# Patient Record
Sex: MALE | Race: WHITE | Employment: FULL TIME | URBAN - METROPOLITAN AREA
[De-identification: names, ages, dates, MRNs, and addresses within clinical notes are randomized per-mention and may not be internally consistent; named-entity substitution may affect disease eponyms.]

---

## 2017-03-02 ENCOUNTER — TELEPHONE (OUTPATIENT)
Dept: GASTROENTEROLOGY | Facility: CLINIC | Age: 62
End: 2017-03-02

## 2017-03-02 NOTE — TELEPHONE ENCOUNTER
Patient was unable to be contacted, also unable to leave a message due to full mailbox.  Jose Rogel, CMA

## 2017-03-13 ENCOUNTER — OFFICE VISIT (OUTPATIENT)
Dept: GASTROENTEROLOGY | Facility: CLINIC | Age: 62
End: 2017-03-13
Attending: INTERNAL MEDICINE
Payer: COMMERCIAL

## 2017-03-13 VITALS
HEIGHT: 70 IN | DIASTOLIC BLOOD PRESSURE: 86 MMHG | WEIGHT: 190.8 LBS | HEART RATE: 120 BPM | SYSTOLIC BLOOD PRESSURE: 121 MMHG | TEMPERATURE: 98.1 F | BODY MASS INDEX: 27.32 KG/M2

## 2017-03-13 DIAGNOSIS — K74.60 CIRRHOSIS OF LIVER WITHOUT ASCITES, UNSPECIFIED HEPATIC CIRRHOSIS TYPE (H): ICD-10-CM

## 2017-03-13 DIAGNOSIS — K74.60 CIRRHOSIS OF LIVER WITHOUT ASCITES, UNSPECIFIED HEPATIC CIRRHOSIS TYPE (H): Primary | ICD-10-CM

## 2017-03-13 LAB
AFP SERPL-MCNC: 8.5 UG/L (ref 0–8)
ALBUMIN SERPL-MCNC: 4 G/DL (ref 3.4–5)
ALP SERPL-CCNC: 91 U/L (ref 40–150)
ALT SERPL W P-5'-P-CCNC: 66 U/L (ref 0–70)
ANION GAP SERPL CALCULATED.3IONS-SCNC: 9 MMOL/L (ref 3–14)
AST SERPL W P-5'-P-CCNC: 41 U/L (ref 0–45)
BILIRUB DIRECT SERPL-MCNC: 0.4 MG/DL (ref 0–0.2)
BILIRUB SERPL-MCNC: 1.1 MG/DL (ref 0.2–1.3)
BUN SERPL-MCNC: 14 MG/DL (ref 7–30)
CALCIUM SERPL-MCNC: 8.9 MG/DL (ref 8.5–10.1)
CHLORIDE SERPL-SCNC: 104 MMOL/L (ref 94–109)
CO2 SERPL-SCNC: 29 MMOL/L (ref 20–32)
CREAT SERPL-MCNC: 0.99 MG/DL (ref 0.66–1.25)
ERYTHROCYTE [DISTWIDTH] IN BLOOD BY AUTOMATED COUNT: 14 % (ref 10–15)
GFR SERPL CREATININE-BSD FRML MDRD: 76 ML/MIN/1.7M2
GLUCOSE SERPL-MCNC: 121 MG/DL (ref 70–99)
HCT VFR BLD AUTO: 44.7 % (ref 40–53)
HGB BLD-MCNC: 15.3 G/DL (ref 13.3–17.7)
INR PPP: 1.54 (ref 0.86–1.14)
MCH RBC QN AUTO: 32.4 PG (ref 26.5–33)
MCHC RBC AUTO-ENTMCNC: 34.2 G/DL (ref 31.5–36.5)
MCV RBC AUTO: 95 FL (ref 78–100)
PLATELET # BLD AUTO: 115 10E9/L (ref 150–450)
POTASSIUM SERPL-SCNC: 3.8 MMOL/L (ref 3.4–5.3)
PROT SERPL-MCNC: 8 G/DL (ref 6.8–8.8)
RBC # BLD AUTO: 4.72 10E12/L (ref 4.4–5.9)
SODIUM SERPL-SCNC: 141 MMOL/L (ref 133–144)
WBC # BLD AUTO: 4.7 10E9/L (ref 4–11)

## 2017-03-13 PROCEDURE — 80048 BASIC METABOLIC PNL TOTAL CA: CPT | Performed by: INTERNAL MEDICINE

## 2017-03-13 PROCEDURE — 82105 ALPHA-FETOPROTEIN SERUM: CPT | Performed by: INTERNAL MEDICINE

## 2017-03-13 PROCEDURE — 80076 HEPATIC FUNCTION PANEL: CPT | Performed by: INTERNAL MEDICINE

## 2017-03-13 PROCEDURE — 85027 COMPLETE CBC AUTOMATED: CPT | Performed by: INTERNAL MEDICINE

## 2017-03-13 PROCEDURE — 36415 COLL VENOUS BLD VENIPUNCTURE: CPT | Performed by: INTERNAL MEDICINE

## 2017-03-13 PROCEDURE — 85610 PROTHROMBIN TIME: CPT | Performed by: INTERNAL MEDICINE

## 2017-03-13 PROCEDURE — 99212 OFFICE O/P EST SF 10 MIN: CPT | Mod: ZF

## 2017-03-13 ASSESSMENT — PAIN SCALES - GENERAL: PAINLEVEL: MODERATE PAIN (4)

## 2017-03-13 NOTE — PROGRESS NOTES
I had the pleasure of seeing Dwight Sanchez for followup in the Liver Clinic at the [facility] on 03/13/2017.  Mr. 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  He is a sustained virologic responder to hepatitis C treatment.      His major clinical complaint is that of recurrent atrial fibrillation.  He has had ablations done x3. He has been cardioverted multiple times, but it lasts for a very short period of time.  He has been tried a number of medications, but nothing has been able to keep him in sinus rhythm.  He is on oral anticoagulation.      From a liver standpoint, he does report some very mild epigastric pain.  He denies any itching or skin rash.  He does complain of fairly marked fatigue which he blames on his atrial fibrillation.  He denies any fevers or chills.  He does have fairly significant dyspnea on exertion.  He denies any nausea, vomiting, diarrhea or constipation.  He has not had any gastrointestinal bleeding or any overt signs of encephalopathy.  His appetite has been good, and his weight has remained for the most part the same.  He has not had any gastrointestinal bleeding or overt signs of encephalopathy.  There have been no other new events since he was last seen.       Current Outpatient Prescriptions   Medication     lisinopril (PRINIVIL,ZESTRIL) 20 MG tablet     atenolol (TENORMIN) 25 MG tablet     rivaroxaban ANTICOAGULANT (XARELTO) 20 MG TABS tablet     furosemide (LASIX) 20 MG tablet     vitamin E 400 UNIT capsule     No current facility-administered medications for this visit.      B/P: 121/86, T: 98.1, P: 120, R: Data Unavailable    HEENT exam shows no scleral icterus and no temporal muscle wasting.  His chest is clear.  His abdominal exam shows no increase in girth.  No masses or tenderness to palpation are present.  His liver is at 10 cm in span without left lobe enlargement.  No spleen tip is palpable, and extremity exam shows  no edema.  Skin exam shows no stigmata of chronic liver disease.  Neurologic exam shows no asterixis.       Recent Results (from the past 168 hour(s))    Hepatic Panel [LAB20]    Collection Time: 03/13/17  1:44 PM   Result Value Ref Range    Bilirubin Direct 0.4 (H) 0.0 - 0.2 mg/dL    Bilirubin Total 1.1 0.2 - 1.3 mg/dL    Albumin 4.0 3.4 - 5.0 g/dL    Protein Total 8.0 6.8 - 8.8 g/dL    Alkaline Phosphatase 91 40 - 150 U/L    ALT 66 0 - 70 U/L    AST 41 0 - 45 U/L   Basic metabolic panel [LAB15]    Collection Time: 03/13/17  1:44 PM   Result Value Ref Range    Sodium 141 133 - 144 mmol/L    Potassium 3.8 3.4 - 5.3 mmol/L    Chloride 104 94 - 109 mmol/L    Carbon Dioxide 29 20 - 32 mmol/L    Anion Gap 9 3 - 14 mmol/L    Glucose 121 (H) 70 - 99 mg/dL    Urea Nitrogen 14 7 - 30 mg/dL    Creatinine 0.99 0.66 - 1.25 mg/dL    GFR Estimate 76 >60 mL/min/1.7m2    GFR Estimate If Black >90   GFR Calc   >60 mL/min/1.7m2    Calcium 8.9 8.5 - 10.1 mg/dL   CBC with platelets [JFI418]    Collection Time: 03/13/17  1:44 PM   Result Value Ref Range    WBC 4.7 4.0 - 11.0 10e9/L    RBC Count 4.72 4.4 - 5.9 10e12/L    Hemoglobin 15.3 13.3 - 17.7 g/dL    Hematocrit 44.7 40.0 - 53.0 %    MCV 95 78 - 100 fl    MCH 32.4 26.5 - 33.0 pg    MCHC 34.2 31.5 - 36.5 g/dL    RDW 14.0 10.0 - 15.0 %    Platelet Count 115 (L) 150 - 450 10e9/L   INR [[id number]]    Collection Time: 03/13/17  1:44 PM   Result Value Ref Range    INR 1.54 (H) 0.86 - 1.14      My impression is that Mr. 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  His liver disease is very well compensated.  All complications are well addressed.  He will get another MRI in May, as he has had lesions seen by ultrasound that have not been confirmed by MRI.  He will be seeing the electrophysiologist next week, and I certainly recommended that he ask about all potential approaches to his atrial fibrillation, as he seems to be quite symptomatic and really it his major  source of disability.  I, otherwise, will not be making any change to his medical regimen.  I will see him back in the clinic again in 6 months.      Thank you very much for allowing me to participate in the care of this patient.  If you have any questions regarding my recommendations, please do not hesitate to contact me.       Dwight Zamudio MD      Professor of Medicine  [employer]      Executive Medical Director, Solid Organ Transplant Program  [facility]

## 2017-03-13 NOTE — MR AVS SNAPSHOT
After Visit Summary   3/13/2017    Dwight Sanchez    MRN: [id number]           Patient Information     Date Of Birth          1955        Visit Information        Provider Department      3/13/2017 2:15 PM Dwight Zamudio MD [facility]        Today's Diagnoses     Cirrhosis of liver without ascites, unspecified hepatic cirrhosis type (H)    -  1       Follow-ups after your visit        Follow-up notes from your care team     Return in about 6 months (around 9/13/2017).      Your next 10 appointments already scheduled     Apr 05, 2017  8:00 AM CDT   (Arrive by 7:45 AM)   RETURN ARRHYTHMIA with Meek Edmond MD   [facility] ([facility])    [address] [place] [address]  3rd [facility] [address] [phone]            Apr 05, 2017  1:00 PM CDT   (Arrive by 12:45 PM)   MR ABDOMEN W/O & W CONTRAST with [address] MRI ([facility])    [address] [place] [id number] [phone]           Take your medicines as usual, unless your doctor tells you not to. Bring a list of your current medicines to your exam (including vitamins, minerals and over-the-counter drugs). Also bring the results of similar scans you may have had.    The day before your exam, drink extra fluids at least six 8-ounce glasses (unless your doctor tells you to restrict your fluids).   Have a blood test (creatinine test) within 30 days of your exam. Go to your clinic or Diagnostic Imaging Department for this test.   Do not eat or drink for 6 hours prior to exam.  The MRI machine uses a strong magnet. Please wear clothes without metal (snaps, zippers). A sweatsuit works well, or we may give you a hospital gown.  Please remove any body piercings and hair extensions before you arrive. You will also remove watches, jewelry, hairpins, wallets, dentures, partial dental plates and hearing aids. You may wear contact lenses,  and you may be able to wear your rings. We have a safe place to keep your personal items, but it is safer to leave them at home.   **IMPORTANT** THE INSTRUCTIONS BELOW ARE ONLY FOR THOSE PATIENTS WHO HAVE BEEN TOLD THEY WILL RECEIVE SEDATION OR GENERAL ANESTHESIA DURING THEIR MRI PROCEDURE:  IF YOU WILL RECEIVE SEDATION (take medicine to help you relax during your exam):   You must get the medicine from your doctor before you arrive. Bring the medicine to the exam. Do not take it at home.   Arrive one hour early. Bring someone who can take you home after the test. Your medicine will make you sleepy. After the exam, you may not drive, take a bus or take a taxi by yourself.   No eating 8 hours before your exam. You may have clear liquids up until 4 hours before your exam. (Clear liquids include water, clear tea, black coffee and fruit juice without pulp.)  IF YOU WILL RECEIVE ANESTHESIA (be asleep for your exam):   Arrive 1 1/2 hours early. Bring someone who can take you home after the test. You may not drive, take a bus or take a taxi by yourself.   No eating 8 hours before your exam. You may have clear liquids up until 4 hours before your exam. (Clear liquids include water, clear tea, black coffee and fruit juice without pulp.)  If you have any questions, please contact your Imaging Department exam site.            Sep 11, 2017  1:00 PM CDT   Lab with  LAB   [facility] ([facility])    [address] [place] [address]  1st [facility] [id number] [phone]            Sep 11, 2017  2:00 PM CDT   (Arrive by 1:45 PM)   Return General Liver with Dwight Zamudio MD   [facility] ([facility])    [address] [place] [address]  3[address] [facility] [id number] [phone]              Future tests that were ordered for you today     Open Future Orders        Priority Expected Expires Ordered    MRI Abdomen w & w/o contrast* Routine 5/13/2017 6/12/2017 3/13/2017      "Hepatic Panel [LAB20] Routine 9/9/2017 4/12/2018 3/13/2017    Basic metabolic panel [LAB15] Routine 9/9/2017 4/12/2018 3/13/2017    CBC with platelets [[id number]] Routine 9/9/2017 4/12/2018 3/13/2017    INR [[id number]] Routine 9/9/2017 4/12/2018 3/13/2017    AFP tumor marker [BAJ801] Routine 9/9/2017 4/12/2018 3/13/2017            Who to contact     If you have questions or need follow up information about today's clinic visit or your schedule please contact [facility]PAT[facility] directly at [phone].  Normal or non-critical lab and imaging results will be communicated to you by One [employer] Translation[employer], letter or phone within 4 business days after the clinic has received the results. If you do not hear from us within 7 days, please contact the clinic through Gamart or phone. If you have a critical or abnormal lab result, we will notify you by phone as soon as possible.  Submit refill requests through [employer] or call your pharmacy and they will forward the refill request to us. Please allow 3 business days for your refill to be completed.          Additional Information About Your Visit        [employer] Information     [employer] gives you secure access to your electronic health record. If you see a primary care provider, you can also send messages to your care team and make appointments. If you have questions, please call your primary care clinic.  If you do not have a primary care provider, please call [phone] and they will assist you.        Care EveryWhere ID     This is your Care EveryWhere ID. This could be used by other organizations to access your [facility] medical records  [id number]        Your Vitals Were     Pulse Temperature Height BMI (Body Mass Index)          120 98.1  F (36.7  C) (Oral) 1.778 m (5' 10\") 27.38 kg/m2         Blood Pressure from Last 3 Encounters:   03/13/17 121/86   09/15/16 134/90   07/29/16 156/75    Weight from Last 3 Encounters:   03/13/17 86.5 kg (190 lb 12.8 oz)   09/15/16 86.2 kg (190 lb) "   07/29/16 84.8 kg (187 lb)              We Performed the Following     Schedule follow up appointments          Today's Medication Changes          These changes are accurate as of: 3/13/17  2:35 PM.  If you have any questions, ask your nurse or doctor.               These medicines have changed or have updated prescriptions.        Dose/Directions    atenolol 25 MG tablet   Commonly known as:  TENORMIN   This may have changed:    - how much to take  - when to take this   Used for:  Atrial fibrillation, unspecified type (H)        Dose:  25 mg   Take 1 tablet (25 mg) by mouth daily   Quantity:  90 tablet   Refills:  1                Primary Care Provider Office Phone # Fax #    Hector Villar [phone]       [facility] [address] [facility] [address]        Thank you!     Thank you for choosing [facility]PAT[facility]  for your care. Our goal is always to provide you with excellent care. Hearing back from our patients is one way we can continue to improve our services. Please take a few minutes to complete the written survey that you may receive in the mail after your visit with us. Thank you!             Your Updated Medication List - Protect others around you: Learn how to safely use, store and throw away your medicines at www.disposemymeds.org.          This list is accurate as of: 3/13/17  2:35 PM.  Always use your most recent med list.                   Brand Name Dispense Instructions for use    atenolol 25 MG tablet    TENORMIN    90 tablet    Take 1 tablet (25 mg) by mouth daily       furosemide 20 MG tablet    LASIX    62 tablet    Take 1 tablet (20 mg) by mouth 2 times daily       lisinopril 20 MG tablet    PRINIVIL/ZESTRIL    90 tablet    Take 1 tablet (20 mg) by mouth daily       rivaroxaban ANTICOAGULANT 20 MG Tabs tablet    XARELTO    90 tablet    Take 1 tablet (20 mg) by mouth daily (with dinner)       vitamin E 400 UNIT capsule     100 capsule    Take 1 capsule by mouth daily.

## 2017-03-13 NOTE — NURSING NOTE
Chief Complaint   Patient presents with     RECHECK     Cirrhosis of Liver   Pt roomed, vitals, meds, and allergies reviewed with pt. Pt ready for provider.  Jose Rogel, CMA

## 2017-03-13 NOTE — LETTER
3/13/2017      RE: Dwight Sanchez  [address] [facility] [address]       I had the pleasure of seeing Dwight Sanchez for followup in the Liver Clinic at the [facility] on 03/13/2017.  Mr. 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  He is a sustained virologic responder to hepatitis C treatment.      His major clinical complaint is that of recurrent atrial fibrillation.  He has had ablations done x3. He has been cardioverted multiple times, but it lasts for a very short period of time.  He has been tried a number of medications, but nothing has been able to keep him in sinus rhythm.  He is on oral anticoagulation.      From a liver standpoint, he does report some very mild epigastric pain.  He denies any itching or skin rash.  He does complain of fairly marked fatigue which he blames on his atrial fibrillation.  He denies any fevers or chills.  He does have fairly significant dyspnea on exertion.  He denies any nausea, vomiting, diarrhea or constipation.  He has not had any gastrointestinal bleeding or any overt signs of encephalopathy.  His appetite has been good, and his weight has remained for the most part the same.  He has not had any gastrointestinal bleeding or overt signs of encephalopathy.  There have been no other new events since he was last seen.       Current Outpatient Prescriptions   Medication     lisinopril (PRINIVIL,ZESTRIL) 20 MG tablet     atenolol (TENORMIN) 25 MG tablet     rivaroxaban ANTICOAGULANT (XARELTO) 20 MG TABS tablet     furosemide (LASIX) 20 MG tablet     vitamin E 400 UNIT capsule     No current facility-administered medications for this visit.      B/P: 121/86, T: 98.1, P: 120, R: Data Unavailable    HEENT exam shows no scleral icterus and no temporal muscle wasting.  His chest is clear.  His abdominal exam shows no increase in girth.  No masses or tenderness to palpation are present.  His liver is at 10 cm  in span without left lobe enlargement.  No spleen tip is palpable, and extremity exam shows no edema.  Skin exam shows no stigmata of chronic liver disease.  Neurologic exam shows no asterixis.       Recent Results (from the past 168 hour(s))    Hepatic Panel [LAB20]    Collection Time: 03/13/17  1:44 PM   Result Value Ref Range    Bilirubin Direct 0.4 (H) 0.0 - 0.2 mg/dL    Bilirubin Total 1.1 0.2 - 1.3 mg/dL    Albumin 4.0 3.4 - 5.0 g/dL    Protein Total 8.0 6.8 - 8.8 g/dL    Alkaline Phosphatase 91 40 - 150 U/L    ALT 66 0 - 70 U/L    AST 41 0 - 45 U/L   Basic metabolic panel [LAB15]    Collection Time: 03/13/17  1:44 PM   Result Value Ref Range    Sodium 141 133 - 144 mmol/L    Potassium 3.8 3.4 - 5.3 mmol/L    Chloride 104 94 - 109 mmol/L    Carbon Dioxide 29 20 - 32 mmol/L    Anion Gap 9 3 - 14 mmol/L    Glucose 121 (H) 70 - 99 mg/dL    Urea Nitrogen 14 7 - 30 mg/dL    Creatinine 0.99 0.66 - 1.25 mg/dL    GFR Estimate 76 >60 mL/min/1.7m2    GFR Estimate If Black >90   GFR Calc   >60 mL/min/1.7m2    Calcium 8.9 8.5 - 10.1 mg/dL   CBC with platelets [[id number]]    Collection Time: 03/13/17  1:44 PM   Result Value Ref Range    WBC 4.7 4.0 - 11.0 10e9/L    RBC Count 4.72 4.4 - 5.9 10e12/L    Hemoglobin 15.3 13.3 - 17.7 g/dL    Hematocrit 44.7 40.0 - 53.0 %    MCV 95 78 - 100 fl    MCH 32.4 26.5 - 33.0 pg    MCHC 34.2 31.5 - 36.5 g/dL    RDW 14.0 10.0 - 15.0 %    Platelet Count 115 (L) 150 - 450 10e9/L   INR [[id number]]    Collection Time: 03/13/17  1:44 PM   Result Value Ref Range    INR 1.54 (H) 0.86 - 1.14      My impression is that Mr. 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  His liver disease is very well compensated.  All complications are well addressed.  He will get another MRI in May, as he has had lesions seen by ultrasound that have not been confirmed by MRI.  He will be seeing the electrophysiologist next week, and I certainly recommended that he ask about all potential  approaches to his atrial fibrillation, as he seems to be quite symptomatic and really it his major source of disability.  I, otherwise, will not be making any change to his medical regimen.  I will see him back in the clinic again in 6 months.      Thank you very much for allowing me to participate in the care of this patient.  If you have any questions regarding my recommendations, please do not hesitate to contact me.       Dwight Zamudio MD      Professor of Medicine  [employer]      Executive Medical Director, Solid Organ Transplant Program  [facility]

## 2017-04-04 ENCOUNTER — PRE VISIT (OUTPATIENT)
Dept: CARDIOLOGY | Facility: CLINIC | Age: 62
End: 2017-04-04

## 2017-04-04 DIAGNOSIS — I48.91 ATRIAL FIBRILLATION, UNSPECIFIED TYPE (H): Primary | ICD-10-CM

## 2017-04-04 ASSESSMENT — ENCOUNTER SYMPTOMS
WHEEZING: 1
DYSURIA: 0
SNORES LOUDLY: 1
MUSCLE WEAKNESS: 0
DIFFICULTY URINATING: 1
SYNCOPE: 0
DYSPNEA ON EXERTION: 0
LIGHT-HEADEDNESS: 0
EYE PAIN: 0
ALTERED TEMPERATURE REGULATION: 0
SKIN CHANGES: 0
SLEEP DISTURBANCES DUE TO BREATHING: 1
STIFFNESS: 1
RESPIRATORY PAIN: 0
TACHYCARDIA: 1
NECK MASS: 0
ORTHOPNEA: 1
SPUTUM PRODUCTION: 1
MUSCLE CRAMPS: 1
TASTE DISTURBANCE: 0
EYE IRRITATION: 0
HYPOTENSION: 0
POOR WOUND HEALING: 0
EXERCISE INTOLERANCE: 1
NECK PAIN: 1
INCREASED ENERGY: 1
HOARSE VOICE: 1
JOINT SWELLING: 0
HYPERTENSION: 1
POLYPHAGIA: 0
COUGH DISTURBING SLEEP: 1
WEIGHT LOSS: 0
NIGHT SWEATS: 0
POSTURAL DYSPNEA: 0
CHILLS: 0
MYALGIAS: 1
HALLUCINATIONS: 0
ARTHRALGIAS: 1
SINUS PAIN: 0
SMELL DISTURBANCE: 0
NAIL CHANGES: 0
DOUBLE VISION: 0
WEIGHT GAIN: 1
FLANK PAIN: 0
LEG SWELLING: 0
SHORTNESS OF BREATH: 1
EYE REDNESS: 1
PALPITATIONS: 1
CLAUDICATION: 0
LEG PAIN: 0
SINUS CONGESTION: 1
COUGH: 1
DECREASED APPETITE: 0
POLYDIPSIA: 0
HEMOPTYSIS: 0
FEVER: 0
FATIGUE: 1
SORE THROAT: 1
BACK PAIN: 1
EYE WATERING: 0
HEMATURIA: 0
TROUBLE SWALLOWING: 0

## 2017-04-05 ENCOUNTER — OFFICE VISIT (OUTPATIENT)
Dept: CARDIOLOGY | Facility: CLINIC | Age: 62
End: 2017-04-05
Attending: INTERNAL MEDICINE
Payer: COMMERCIAL

## 2017-04-05 VITALS
BODY MASS INDEX: 27.9 KG/M2 | WEIGHT: 194.9 LBS | HEIGHT: 70 IN | OXYGEN SATURATION: 97 % | SYSTOLIC BLOOD PRESSURE: 138 MMHG | HEART RATE: 114 BPM | DIASTOLIC BLOOD PRESSURE: 88 MMHG

## 2017-04-05 DIAGNOSIS — F41.9 ANXIETY: ICD-10-CM

## 2017-04-05 DIAGNOSIS — I48.91 ATRIAL FIBRILLATION, UNSPECIFIED TYPE (H): ICD-10-CM

## 2017-04-05 PROCEDURE — 99212 OFFICE O/P EST SF 10 MIN: CPT | Mod: ZF

## 2017-04-05 PROCEDURE — 93005 ELECTROCARDIOGRAM TRACING: CPT | Mod: ZF

## 2017-04-05 PROCEDURE — 99214 OFFICE O/P EST MOD 30 MIN: CPT | Mod: ZP | Performed by: INTERNAL MEDICINE

## 2017-04-05 PROCEDURE — 93010 ELECTROCARDIOGRAM REPORT: CPT | Mod: ZP | Performed by: INTERNAL MEDICINE

## 2017-04-05 RX ORDER — METOPROLOL SUCCINATE 100 MG/1
100 TABLET, EXTENDED RELEASE ORAL DAILY
Qty: 90 TABLET | Refills: 3 | Status: SHIPPED | OUTPATIENT
Start: 2017-04-05 | End: 2017-04-10

## 2017-04-05 RX ORDER — DIAZEPAM 5 MG
10 TABLET ORAL EVERY 6 HOURS PRN
Qty: 4 TABLET | Refills: 0 | Status: SHIPPED | OUTPATIENT
Start: 2017-04-05 | End: 2018-03-20

## 2017-04-05 ASSESSMENT — PAIN SCALES - GENERAL: PAINLEVEL: NO PAIN (0)

## 2017-04-05 NOTE — LETTER
4/5/2017      RE: Dwight Sanchez  [address] [facility] [id number]       Dear Colleague,    Thank you for the opportunity to participate in the care of your patient, Dwight Sanchez, at the [facility] at [facility]. Please see a copy of my visit note below.    Electrophysiology Clinic Note  HPI:   Mr. Sanchez is a 61 year old male who has a past medical history significant for hep C with liver cirrhosis (treated with antivirals), BPH, atrial tachycardia, and persistent afib (CHADSVASC 1) s/p afib ablation at outside hospitals 2001 (PVI, vein ablations) 2009 (redo PVI of right veins, roof line, mitral isthmus line, ?CS isolation, septal line) and last on 9/3/15 (PVI, CAFE, CTI), and 9/3/2015 with repeat WACA and PVI, CFEA, and CTI ablation for A.fib/AFL and notice of very high scar burden before ablation. He presents today for follow up.   He has had several recurrences of atrial tachyarrhythmias requiring DCCV. Since he had several recurrences on flecainide, we recommended repeat ablation especially that his arrhythmias were organized atrial tachycardia. From a previous documentation, it seems like the atrial tachycardia has similar morphology. We mentioned to him that the best would be to take him to the lab in his atrial tachycardia. He did agree with the plan. He then called reporting he was back in his arrhyhtmia and we arranged for ablation. Unfortunately, he presented for the ablation in A.fib and not the more organized atrial tachycardia he had previously had. We discussed with him that this is more disorganized and accelerating (several DCCVs during the last 3 months, twice in the last 2 weeks), and the ablation success might decrease with disorganized rhythm. He then underwent PVI, CFEA, and CTI ablation for A.fib on 9/3/15. During the ablation, it was noted that his LA had about 80% scar by voltage criteria, with unfavorable long term prognosis  for recurrence. He had recurrence of A.fib on 9/4 and underwent unsuccessful DCCV (150J and 200J attempts) on 9/5/15. Antiarrhythmic options somewhat limited given prolonged QTc and Amiodarone had a black box warning if administered with his HepC antivirals. His atenolol was increased and he was continued on Flecainide. He was discharged to come in for early clinic follow up. He continued to feel fatigued and unwell in his A.fib and we did DCCV on 9/11/15. He had recurrence and underwent DCCV in October at outside hospital again with recurrence about 5 days later. Flecainide was stopped. We discussed use of amiodarone once he was off his antivirals.   EP visit 12/23/2015: He now presents today for follow up. He has finished his HepC antiviral course and reports he has been cured. He continues to be in A.fib and reports fatigue, MACE, and some ocasional palpitations. He denies any chest pain, dizziness, lightheadedness, shortness of breath, or syncopal symptoms. He has otherwise been doing well. Presenting 12 lead ECG shows A.fib Vent Rate 104 bpm,  ms, QTc 531 ms. He continues on atenolol and Xarelto. Amiodarone loading was started with the aim for DCCV after loading.  EP visit 3/23/2016: Patient was cardioverted on 1/3/2016 and he went back to AFib on 2/24/2016. He felt the difference while he was on SR, but not as much fatigue when he reverted back. Otherwise, denies chest pain, SOB, edema, PND, orthopnea. He mentions that he has daily short episodes of palpitations. He had stopped amiodarone after the recurrence.  EP Visit 5/16/16 (Jamshid): At this time he feels fatigued and has MACE. He denies any orthopnea or lightheadedness. He denies any chest pain. He has HTN but denies DM, MI, CVA, CHF, vascular disease. CMRI showed normal LV function, no ischemia, infiltrate, no HCM. His diltiazem was increased at this visit and he was started on Flecainide 100 mg BID with plans to attempt DCCV which he had on  5/25/16.    He presents today for follow up. He has been following most recently at [facility] for his AF management. His Flecainide had been stopped and he was put on amiodarone. He underwent a DCCV in January 2017 which he maintained sinus for 2 days and then reverted back to AF. He has now stopped all AATs and continues only on Atenolol. He reported feeling very well in sinus for those two days and continues to be very bothered by his AF. He is frustrated that he cannot stay in sinus. He feels fatigued with MACE and some palpitations. Presenting 12 lead ECG shows AF with RVR Vent Rate 136 bpm,  ms, QTc 523 ms. Current cardiac medications include: Atenolol, Lisinopril, Lasix, and Xarelto.     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      Family history of sudden cardiac death 3/6/2015     Hepatitis C     genotype 1     History of adenomatous polyp of colon 12/2015    repeat ordered in 5 years     Hypertension        CURRENT MEDICATIONS:  Current Outpatient Prescriptions   Medication Sig Dispense Refill     lisinopril (PRINIVIL,ZESTRIL) 20 MG tablet Take 1 tablet (20 mg) by mouth daily 90 tablet 3     atenolol (TENORMIN) 25 MG tablet Take 1 tablet (25 mg) by mouth daily (Patient taking differently: Take 50 mg by mouth 2 times daily 1/2 po BID) 90 tablet 1     rivaroxaban ANTICOAGULANT (XARELTO) 20 MG TABS tablet Take 1 tablet (20 mg) by mouth daily (with dinner) 90 tablet 0     furosemide (LASIX) 20 MG tablet Take 1 tablet (20 mg) by mouth 2 times daily 62 tablet 6     vitamin E 400 UNIT capsule Take 1 capsule by mouth daily. 100 capsule 12       PAST SURGICAL HISTORY:  Past Surgical History:   Procedure Laterality Date     ANESTHESIA CARDIOVERSION  5/30/2013    Procedure: ANESTHESIA CARDIOVERSION;  Cardioversion;  Surgeon: Provider, Generic Anesthesia;  Location: [facility] OR     ANESTHESIA  "CARDIOVERSION  11/11/2013    Procedure: ANESTHESIA CARDIOVERSION;  Artial flutter;  Surgeon: Provider, Generic Anesthesia;  Location: [facility] OR     ANESTHESIA CARDIOVERSION N/A 9/11/2015    Procedure: ANESTHESIA CARDIOVERSION;  Surgeon: GENERIC ANESTHESIA PROVIDER;  Location: [facility] OR     ANESTHESIA CARDIOVERSION N/A 5/25/2016    Procedure: ANESTHESIA CARDIOVERSION;  Surgeon: GENERIC ANESTHESIA PROVIDER;  Location: [facility] OR     COLONOSCOPY  12/8/15     ESOPHAGOSCOPY, GASTROSCOPY, DUODENOSCOPY (EGD), COMBINED  12/21/2012    Procedure: COMBINED ESOPHAGOSCOPY, GASTROSCOPY, DUODENOSCOPY (EGD);;  Surgeon: Sánchez Leahy MD;  Location: [facility] GI     H ABLATION FOCAL AFIB  2001, 2009       ALLERGIES:   No Known Allergies    FAMILY HISTORY:  Family History   Problem Relation Age of Onset     Heart Failure Brother 57     screening done, ICD implanted     Heart Failure Mother        SOCIAL HISTORY:  Social History   Substance Use Topics     Smoking status: Former Smoker     Smokeless tobacco: Never Used     Alcohol use No       ROS:   A comprehensive 10 point review of systems negative other than as mentioned in HPI.  Exam:  /88 (BP Location: Right arm, Patient Position: Chair, Cuff Size: Adult Large)  Pulse 114  Ht 1.778 m (5' 10\")  Wt 88.4 kg (194 lb 14.4 oz)  SpO2 97%  BMI 27.97 kg/m2  GENERAL APPEARANCE:  alert and no distress  HEENT: no icterus, no xanthelasmas, normal pupil size and reaction, normal palate, mucosa moist, no central cyanosis  NECK: no adenopathy, no asymmetry, masses, or scars, thyroid normal to palpation and no bruits, JVP not elevated  RESPIRATORY: lungs clear to auscultation - no rales, rhonchi or wheezes, no use of accessory muscles, no retractions, respirations are unlabored, normal respiratory rate  CARDIOVASCULAR: irregular rhythm, no murmur, click or rub, precordium quiet with normal PMI.  ABDOMEN: soft, non tender, without hepatosplenomegaly, no masses palpable, bowel sounds normal, " aorta not enlarged by palpation, no abdominal bruits  EXTREMITIES: peripheral pulses normal, no edema, no bruits  NEURO: alert and oriented to person/place/time, normal speech, gait and affect  VASC: Radial, femoral, dorsalis pedis and posterior tibialis pulses are normal in volumes and symmetric bilaterally. No bruits are heard.  SKIN: no ecchymoses, no rashes    Labs:  Reviewed.     Testing/Procedures:  PULMONARY FUNCTION TESTS:   PFT Latest Ref Rng & Units 3/23/2016   FVC L 4.11   FEV1 L 3.18   FVC% % 90   FEV1% % 90       2015 CMRI:  1. The left ventricle is normal in cavity size with normal wall  thickness and normal mass. The global systolic function is normal. The  quantitative LV ejection fraction is 65%. There are no regional wall  motion abnormalities.      LV volumes absolute and indexed to BSA with age and gender-matched  normal values in parentheses (mean, 95% CI) are listed below:     EDV: 221 ml (150, 109-191 mL)  EDVI: 110 ml/m2 (77, 60-95 mL/m2)  ESV: 78 ml (49, 27-72 mL)  ESVI: 39 ml/m2 (25, 14-36 mL/m2)     2. The right ventricle is normal in cavity size and systolic function.  The quantitative RV ejection fraction is 60%.     3. There is moderate biatrial enlargement.     4. The aortic valve is trileaflet in morphology. There is no  significant aortic valve stenosis or regurgitation.      5. There is no systolic anterior motion of the mitral valve. There is  mild mitral regurgitation. There is mild tricuspid regurgitation.     6. Delayed enhancement imaging demonstrates no evidence of myocardial  infarction, fibrosis or infiltrative disease.      Assessment and Plan:   Mr. Sanchez is a 61 year old male who has a past medical history significant for hep C with liver cirrhosis (treated with antivirals), BPH, atrial tachycardia, and persistent afib (CHADSVASC 1) s/p afib ablation at outside hospitals 2001 (PVI, vein ablations) 2009 (redo PVI of right veins, roof line, mitral isthmus line, ?CS  isolation, septal line) and last on 9/3/15 (PVI, CAFE, CTI), and 9/3/2015 with repeat WACA and PVI, CFEA, and CTI ablation for A.fib/AFL and notice of very high scar burden before ablation, and s/p multiple DCCVs last 2017. He presents today for follow up. He has been following most recently at [facility] for his AF management. His Flecainide had been stopped and he was put on amiodarone. He underwent a DCCV in 2017 which he maintained sinus for 2 days and then reverted back to AF. He has now stopped all AATs and continues only on Atenolol. He reported feeling very well in sinus for those two days and continues to be very bothered by his AF. He is frustrated that he cannot stay in sinus. He feels fatigued with MACE and some palpitations.    We discussed in detail with the patient management/treatment options for Itz includin. Stroke Prophylaxis:  CHADSVASC=+HTN  1, corresponding to a 1.3% annual stroke / systemic emolism event rate. indicating need for long term oral anticoagulation.  He is appropriately on Xarelto. No bleeding issues.   2. Rate Control: He is not well rate controlled today. We would recommend stopping Atenolol and starting Toprol  mg daily.   3. Rhythm Control: He has had multiple DCCVs, ablations, and AATs which have failed to maintain sinus for any meaningful length of time. He is very bothered by his AF. We discussed there is low likelihood of maintaining sinus long term. Therefore, we do not see pursing more DCCVs or ablations to be fruitful. We would recommend AVN ablation and PPM implant with HIS bundle pacing. He apparently already has a AVN ablation arranged at [facility] but was hesitant to pursue. We discussed this most likely be the best option to help with his symptoms from AF. He states he will most likely pursue this option at [facility] given it is so much closer to his house.     4. Risk Factor Management: maintain tight BP control, and TYRESE evaluation as indicated.       Follow up as needed.     The patient states understanding and is agreeable with plan.   This patient was seen and evaluated with Dr. Edmond. The above note reflects our joint assessment and plan.   JASIEL Novoa CNP  Pager: [phone]      EP STAFF NOTE  I have seen and examined the patient as part of a shared visit with Preeti Krishna, [place] NP.  I agree with the note above. I reviewed today's vital signs and medications. I have reviewed and discussed with the advanced practice provider their physical examination, assessment, and plan   Briefly, patient with multiple attempts at Afib ablation, extensive LA scar, limited choice of AATs, now in afib that failed rate control, here for a second opinion after recent recommendation from OSH for AVN ablation and pacemaker.  My key history/exam findings are: irregular heat beat, afib with RVR.   The key management decisions made by me: We agreed with the recommendation of AVN ablation and pacemaker to control his RVR as all other measures have failed. I would add the iteration of trying His pacing to conserve the native conduction if possible. He asked if he could try something different meanwhile . Although metabolized by the liver, we could try metoprolol instead of atenolol as it has a better range of doses we could try, and we will stop it as son as he gets AVN ablation. We will check on him next week to make sure whether he is getting side effects to see if we could adjust the dose of switch back to atenolol. Diltiazem caused him to have swelling before.    Meek Edmond MD [facility]  Cardiology - Electrophysiology

## 2017-04-05 NOTE — PATIENT INSTRUCTIONS
Cardiology Provider you saw in clinic today: Dr. Edmond    Medication Changes:     1. Stop atenolol   2. Start Toprol XL 100mg daily     Follow-up Visit:  As needed          Please contact us via [employer] for questions or concerns.    Chantelle Mak RN   Electrophysiology Nurse Coordinator.  [phone]    If your question concerns the schedule/appointment times, contact:  [facility]   [phone]    Device Clinic (Pacemakers, ICD, Loop Recorders)   [phone]      You will receive all normal lab and testing results via [employer] or mail if not reviewed in clinic today.   If you need a medication refill please contact your pharmacy.    As always, thank you for trusting us with your health care needs!

## 2017-04-05 NOTE — MR AVS SNAPSHOT
After Visit Summary   4/5/2017    Dwight Sanchez    MRN: [id number]           Patient Information     Date Of Birth          1955        Visit Information        Provider Department      4/5/2017 8:00 AM Meek Edmond MD [facility]        Today's Diagnoses     Atrial fibrillation, unspecified type (H)          Care Instructions    Cardiology Provider you saw in clinic today: Dr. Edmond    Medication Changes:     1. Stop atenolol   2. Start Toprol XL 100mg daily     Follow-up Visit:  As needed          Please contact us via [employer] for questions or concerns.    Chantelle Mak RN   Electrophysiology Nurse Coordinator.  [phone]    If your question concerns the schedule/appointment times, contact:  [facility]   [phone]    Device Clinic (Pacemakers, ICD, Loop Recorders)   [phone]      You will receive all normal lab and testing results via [employer] or mail if not reviewed in clinic today.   If you need a medication refill please contact your pharmacy.    As always, thank you for trusting us with your health care needs!          Follow-ups after your visit        Follow-up notes from your care team     Return if symptoms worsen or fail to improve.      Your next 10 appointments already scheduled     Apr 05, 2017  1:00 PM CDT   (Arrive by 12:45 PM)   MR ABDOMEN W/O & W CONTRAST with [facility] ([facility])    [address] [place] [id number] [phone]           Take your medicines as usual, unless your doctor tells you not to. Bring a list of your current medicines to your exam (including vitamins, minerals and over-the-counter drugs). Also bring the results of similar scans you may have had.    The day before your exam, drink extra fluids at least six 8-ounce glasses (unless your doctor tells you to restrict your fluids).   Have a blood test (creatinine test) within 30 days  of your exam. Go to your clinic or Diagnostic Imaging Department for this test.   Do not eat or drink for 6 hours prior to exam.  The MRI machine uses a strong magnet. Please wear clothes without metal (snaps, zippers). A sweatsuit works well, or we may give you a hospital gown.  Please remove any body piercings and hair extensions before you arrive. You will also remove watches, jewelry, hairpins, wallets, dentures, partial dental plates and hearing aids. You may wear contact lenses, and you may be able to wear your rings. We have a safe place to keep your personal items, but it is safer to leave them at home.   **IMPORTANT** THE INSTRUCTIONS BELOW ARE ONLY FOR THOSE PATIENTS WHO HAVE BEEN TOLD THEY WILL RECEIVE SEDATION OR GENERAL ANESTHESIA DURING THEIR MRI PROCEDURE:  IF YOU WILL RECEIVE SEDATION (take medicine to help you relax during your exam):   You must get the medicine from your doctor before you arrive. Bring the medicine to the exam. Do not take it at home.   Arrive one hour early. Bring someone who can take you home after the test. Your medicine will make you sleepy. After the exam, you may not drive, take a bus or take a taxi by yourself.   No eating 8 hours before your exam. You may have clear liquids up until 4 hours before your exam. (Clear liquids include water, clear tea, black coffee and fruit juice without pulp.)  IF YOU WILL RECEIVE ANESTHESIA (be asleep for your exam):   Arrive 1 1/2 hours early. Bring someone who can take you home after the test. You may not drive, take a bus or take a taxi by yourself.   No eating 8 hours before your exam. You may have clear liquids up until 4 hours before your exam. (Clear liquids include water, clear tea, black coffee and fruit juice without pulp.)  If you have any questions, please contact your Imaging Department exam site.            Sep 11, 2017  1:00 PM CDT   Lab with [facility] ([facility])    [address] [place] [address]  "Se  1st Floor  [facility] [id number] [phone]            Sep 11, 2017  2:00 PM CDT   (Arrive by 1:45 PM)   Return General Liver with Dwight Zamudio MD   [facility] ([facility])    [address]  3rd Floor  [facility] [id number] [phone]              Who to contact     If you have questions or need follow up information about today's clinic visit or your schedule please contact [facility] directly at [phone].  Normal or non-critical lab and imaging results will be communicated to you by [employer], letter or phone within 4 business days after the clinic has received the results. If you do not hear from us within 7 days, please contact the clinic through [employer] or phone. If you have a critical or abnormal lab result, we will notify you by phone as soon as possible.  Submit refill requests through [employer] or call your pharmacy and they will forward the refill request to us. Please allow 3 business days for your refill to be completed.          Additional Information About Your Visit        [employer] Information     [employer] gives you secure access to your electronic health record. If you see a primary care provider, you can also send messages to your care team and make appointments. If you have questions, please call your primary care clinic.  If you do not have a primary care provider, please call [phone] and they will assist you.        Care EveryWhere ID     This is your Care EveryWhere ID. This could be used by other organizations to access your [facility] medical records  [id number]        Your Vitals Were     Pulse Height Pulse Oximetry BMI (Body Mass Index)          114 1.778 m (5' 10\") 97% 27.97 kg/m2         Blood Pressure from Last 3 Encounters:   04/05/17 138/88   03/13/17 121/86   09/15/16 134/90    Weight from Last 3 Encounters:   04/05/17 88.4 kg (194 lb 14.4 oz)   03/13/17 86.5 kg (190 lb 12.8 oz)   09/15/16 86.2 kg (190 lb)         "      We Performed the Following     EKG 12-lead, tracing only (Future)          Today's Medication Changes          These changes are accurate as of: 4/5/17  9:39 AM.  If you have any questions, ask your nurse or doctor.               Start taking these medicines.        Dose/Directions    metoprolol 100 MG 24 hr tablet   Commonly known as:  TOPROL-XL   Used for:  Atrial fibrillation, unspecified type (H)   Started by:  Meek Edmond MD        Dose:  100 mg   Take 1 tablet (100 mg) by mouth daily   Quantity:  90 tablet   Refills:  3         Stop taking these medicines if you haven't already. Please contact your care team if you have questions.     atenolol 25 MG tablet   Commonly known as:  TENORMIN   Stopped by:  Meek Edmond MD                Where to get your medicines      These medications were sent to [id number] [address], [place] [employer] [address]     Phone:  [phone]     metoprolol 100 MG 24 hr tablet                Primary Care Provider Office Phone # Fax #    Hector JUAREZ Haywood [phone]       [facility] [address] [facility] [address]        Thank you!     Thank you for choosing [facility]  for your care. Our goal is always to provide you with excellent care. Hearing back from our patients is one way we can continue to improve our services. Please take a few minutes to complete the written survey that you may receive in the mail after your visit with us. Thank you!             Your Updated Medication List - Protect others around you: Learn how to safely use, store and throw away your medicines at www.disposemymeds.org.          This list is accurate as of: 4/5/17  9:39 AM.  Always use your most recent med list.                   Brand Name Dispense Instructions for use    furosemide 20 MG tablet    LASIX    62 tablet    Take 1 tablet (20 mg) by mouth 2 times daily        lisinopril 20 MG tablet    PRINIVIL/ZESTRIL    90 tablet    Take 1 tablet (20 mg) by mouth daily       metoprolol 100 MG 24 hr tablet    TOPROL-XL    90 tablet    Take 1 tablet (100 mg) by mouth daily       rivaroxaban ANTICOAGULANT 20 MG Tabs tablet    XARELTO    90 tablet    Take 1 tablet (20 mg) by mouth daily (with dinner)       vitamin E 400 UNIT capsule     100 capsule    Take 1 capsule by mouth daily.

## 2017-04-05 NOTE — PROGRESS NOTES
Electrophysiology Clinic Note  HPI:   Mr. Sanchez is a 61 year old male who has a past medical history significant for hep C with liver cirrhosis (treated with antivirals), BPH, atrial tachycardia, and persistent afib (CHADSVASC 1) s/p afib ablation at outside hospitals 2001 (PVI, vein ablations) 2009 (redo PVI of right veins, roof line, mitral isthmus line, ?CS isolation, septal line) and last on 9/3/15 (PVI, CAFE, CTI), and 9/3/2015 with repeat WACA and PVI, CFEA, and CTI ablation for A.fib/AFL and notice of very high scar burden before ablation. He presents today for follow up.   He has had several recurrences of atrial tachyarrhythmias requiring DCCV. Since he had several recurrences on flecainide, we recommended repeat ablation especially that his arrhythmias were organized atrial tachycardia. From a previous documentation, it seems like the atrial tachycardia has similar morphology. We mentioned to him that the best would be to take him to the lab in his atrial tachycardia. He did agree with the plan. He then called reporting he was back in his arrhyhtmia and we arranged for ablation. Unfortunately, he presented for the ablation in A.fib and not the more organized atrial tachycardia he had previously had. We discussed with him that this is more disorganized and accelerating (several DCCVs during the last 3 months, twice in the last 2 weeks), and the ablation success might decrease with disorganized rhythm. He then underwent PVI, CFEA, and CTI ablation for A.fib on 9/3/15. During the ablation, it was noted that his LA had about 80% scar by voltage criteria, with unfavorable long term prognosis for recurrence. He had recurrence of A.fib on 9/4 and underwent unsuccessful DCCV (150J and 200J attempts) on 9/5/15. Antiarrhythmic options somewhat limited given prolonged QTc and Amiodarone had a black box warning if administered with his HepC antivirals. His atenolol was increased and he was continued on Flecainide.  He was discharged to come in for early clinic follow up. He continued to feel fatigued and unwell in his A.fib and we did DCCV on 9/11/15. He had recurrence and underwent DCCV in October at outside hospital again with recurrence about 5 days later. Flecainide was stopped. We discussed use of amiodarone once he was off his antivirals.   EP visit 12/23/2015: He now presents today for follow up. He has finished his HepC antiviral course and reports he has been cured. He continues to be in A.fib and reports fatigue, MACE, and some ocasional palpitations. He denies any chest pain, dizziness, lightheadedness, shortness of breath, or syncopal symptoms. He has otherwise been doing well. Presenting 12 lead ECG shows A.fib Vent Rate 104 bpm,  ms, QTc 531 ms. He continues on atenolol and Xarelto. Amiodarone loading was started with the aim for DCCV after loading.  EP visit 3/23/2016: Patient was cardioverted on 1/3/2016 and he went back to AFib on 2/24/2016. He felt the difference while he was on SR, but not as much fatigue when he reverted back. Otherwise, denies chest pain, SOB, edema, PND, orthopnea. He mentions that he has daily short episodes of palpitations. He had stopped amiodarone after the recurrence.  EP Visit 5/16/16 ([facility]): At this time he feels fatigued and has MACE. He denies any orthopnea or lightheadedness. He denies any chest pain. He has HTN but denies DM, MI, CVA, CHF, vascular disease. CMRI showed normal LV function, no ischemia, infiltrate, no HCM. His diltiazem was increased at this visit and he was started on Flecainide 100 mg BID with plans to attempt DCCV which he had on 5/25/16.    He presents today for follow up. He has been following most recently at [facility] for his AF management. His Flecainide had been stopped and he was put on amiodarone. He underwent a DCCV in January 2017 which he maintained sinus for 2 days and then reverted back to AF. He has now stopped all AATs and continues only  on Atenolol. He reported feeling very well in sinus for those two days and continues to be very bothered by his AF. He is frustrated that he cannot stay in sinus. He feels fatigued with MACE and some palpitations. Presenting 12 lead ECG shows AF with RVR Vent Rate 136 bpm,  ms, QTc 523 ms. Current cardiac medications include: Atenolol, Lisinopril, Lasix, and Xarelto.     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      Family history of sudden cardiac death 3/6/2015     Hepatitis C     genotype 1     History of adenomatous polyp of colon 12/2015    repeat ordered in 5 years     Hypertension        CURRENT MEDICATIONS:  Current Outpatient Prescriptions   Medication Sig Dispense Refill     lisinopril (PRINIVIL,ZESTRIL) 20 MG tablet Take 1 tablet (20 mg) by mouth daily 90 tablet 3     atenolol (TENORMIN) 25 MG tablet Take 1 tablet (25 mg) by mouth daily (Patient taking differently: Take 50 mg by mouth 2 times daily 1/2 po BID) 90 tablet 1     rivaroxaban ANTICOAGULANT (XARELTO) 20 MG TABS tablet Take 1 tablet (20 mg) by mouth daily (with dinner) 90 tablet 0     furosemide (LASIX) 20 MG tablet Take 1 tablet (20 mg) by mouth 2 times daily 62 tablet 6     vitamin E 400 UNIT capsule Take 1 capsule by mouth daily. 100 capsule 12       PAST SURGICAL HISTORY:  Past Surgical History:   Procedure Laterality Date     ANESTHESIA CARDIOVERSION  5/30/2013    Procedure: ANESTHESIA CARDIOVERSION;  Cardioversion;  Surgeon: Provider, Generic Anesthesia;  Location: [facility] OR     ANESTHESIA CARDIOVERSION  11/11/2013    Procedure: ANESTHESIA CARDIOVERSION;  Artial flutter;  Surgeon: Provider, Generic Anesthesia;  Location: [facility] OR     ANESTHESIA CARDIOVERSION N/A 9/11/2015    Procedure: ANESTHESIA CARDIOVERSION;  Surgeon: GENERIC ANESTHESIA PROVIDER;  Location: [facility] OR     ANESTHESIA CARDIOVERSION N/A 5/25/2016     "Procedure: ANESTHESIA CARDIOVERSION;  Surgeon: GENERIC ANESTHESIA PROVIDER;  Location: [facility] OR     COLONOSCOPY  12/8/15     ESOPHAGOSCOPY, GASTROSCOPY, DUODENOSCOPY (EGD), COMBINED  12/21/2012    Procedure: COMBINED ESOPHAGOSCOPY, GASTROSCOPY, DUODENOSCOPY (EGD);;  Surgeon: Sánchez Leahy MD;  Location: [facility] GI     H ABLATION FOCAL AFIB  2001, 2009       ALLERGIES:   No Known Allergies    FAMILY HISTORY:  Family History   Problem Relation Age of Onset     Heart Failure Brother 57     screening done, ICD implanted     Heart Failure Mother        SOCIAL HISTORY:  Social History   Substance Use Topics     Smoking status: Former Smoker     Smokeless tobacco: Never Used     Alcohol use No       ROS:   A comprehensive 10 point review of systems negative other than as mentioned in HPI.  Exam:  /88 (BP Location: Right arm, Patient Position: Chair, Cuff Size: Adult Large)  Pulse 114  Ht 1.778 m (5' 10\")  Wt 88.4 kg (194 lb 14.4 oz)  SpO2 97%  BMI 27.97 kg/m2  GENERAL APPEARANCE:  alert and no distress  HEENT: no icterus, no xanthelasmas, normal pupil size and reaction, normal palate, mucosa moist, no central cyanosis  NECK: no adenopathy, no asymmetry, masses, or scars, thyroid normal to palpation and no bruits, JVP not elevated  RESPIRATORY: lungs clear to auscultation - no rales, rhonchi or wheezes, no use of accessory muscles, no retractions, respirations are unlabored, normal respiratory rate  CARDIOVASCULAR: irregular rhythm, no murmur, click or rub, precordium quiet with normal PMI.  ABDOMEN: soft, non tender, without hepatosplenomegaly, no masses palpable, bowel sounds normal, aorta not enlarged by palpation, no abdominal bruits  EXTREMITIES: peripheral pulses normal, no edema, no bruits  NEURO: alert and oriented to person/place/time, normal speech, gait and affect  VASC: Radial, femoral, dorsalis pedis and posterior tibialis pulses are normal in volumes and symmetric bilaterally. No bruits are " heard.  SKIN: no ecchymoses, no rashes    Labs:  Reviewed.     Testing/Procedures:  PULMONARY FUNCTION TESTS:   PFT Latest Ref Rng & Units 3/23/2016   FVC L 4.11   FEV1 L 3.18   FVC% % 90   FEV1% % 90       2015 CMRI:  1. The left ventricle is normal in cavity size with normal wall  thickness and normal mass. The global systolic function is normal. The  quantitative LV ejection fraction is 65%. There are no regional wall  motion abnormalities.      LV volumes absolute and indexed to BSA with age and gender-matched  normal values in parentheses (mean, 95% CI) are listed below:     EDV: 221 ml (150, 109-191 mL)  EDVI: 110 ml/m2 (77, 60-95 mL/m2)  ESV: 78 ml (49, 27-72 mL)  ESVI: 39 ml/m2 (25, 14-36 mL/m2)     2. The right ventricle is normal in cavity size and systolic function.  The quantitative RV ejection fraction is 60%.     3. There is moderate biatrial enlargement.     4. The aortic valve is trileaflet in morphology. There is no  significant aortic valve stenosis or regurgitation.      5. There is no systolic anterior motion of the mitral valve. There is  mild mitral regurgitation. There is mild tricuspid regurgitation.     6. Delayed enhancement imaging demonstrates no evidence of myocardial  infarction, fibrosis or infiltrative disease.      Assessment and Plan:   Mr. Sanchez is a 61 year old male who has a past medical history significant for hep C with liver cirrhosis (treated with antivirals), BPH, atrial tachycardia, and persistent afib (CHADSVASC 1) s/p afib ablation at outside hospitals 2001 (PVI, vein ablations) 2009 (redo PVI of right veins, roof line, mitral isthmus line, ?CS isolation, septal line) and last on 9/3/15 (PVI, CAFE, CTI), and 9/3/2015 with repeat WACA and PVI, CFEA, and CTI ablation for A.fib/AFL and notice of very high scar burden before ablation, and s/p multiple DCCVs last 1/2017. He presents today for follow up. He has been following most recently at [facility] for his AF management. His  Flecainide had been stopped and he was put on amiodarone. He underwent a DCCV in 2017 which he maintained sinus for 2 days and then reverted back to AF. He has now stopped all AATs and continues only on Atenolol. He reported feeling very well in sinus for those two days and continues to be very bothered by his AF. He is frustrated that he cannot stay in sinus. He feels fatigued with MACE and some palpitations.    We discussed in detail with the patient management/treatment options for Itz includin. Stroke Prophylaxis:  CHADSVASC=+HTN  1, corresponding to a 1.3% annual stroke / systemic emolism event rate. indicating need for long term oral anticoagulation.  He is appropriately on Xarelto. No bleeding issues.   2. Rate Control: He is not well rate controlled today. We would recommend stopping Atenolol and starting Toprol  mg daily.   3. Rhythm Control: He has had multiple DCCVs, ablations, and AATs which have failed to maintain sinus for any meaningful length of time. He is very bothered by his AF. We discussed there is low likelihood of maintaining sinus long term. Therefore, we do not see pursing more DCCVs or ablations to be fruitful. We would recommend AVN ablation and PPM implant with HIS bundle pacing. He apparently already has a AVN ablation arranged at [facility] but was hesitant to pursue. We discussed this most likely be the best option to help with his symptoms from AF. He states he will most likely pursue this option at [facility] given it is so much closer to his house.     4. Risk Factor Management: maintain tight BP control, and TYRESE evaluation as indicated.      Follow up as needed.     The patient states understanding and is agreeable with plan.   This patient was seen and evaluated with Dr. Edmond. The above note reflects our joint assessment and plan.   Preeti Krishna, JASIEL CNP  Pager: [phone]      EP STAFF NOTE  I have seen and examined the patient as part of a shared visit with Preeti Landis  MO Borden NP.  I agree with the note above. I reviewed today's vital signs and medications. I have reviewed and discussed with the advanced practice provider their physical examination, assessment, and plan   Briefly, patient with multiple attempts at Afib ablation, extensive LA scar, limited choice of AATs, now in afib that failed rate control, here for a second opinion after recent recommendation from OSH for AVN ablation and pacemaker.  My key history/exam findings are: irregular heat beat, afib with RVR.   The key management decisions made by me: We agreed with the recommendation of AVN ablation and pacemaker to control his RVR as all other measures have failed. I would add the iteration of trying His pacing to conserve the native conduction if possible. He asked if he could try something different meanwhile . Although metabolized by the liver, we could try metoprolol instead of atenolol as it has a better range of doses we could try, and we will stop it as son as he gets AVN ablation. We will check on him next week to make sure whether he is getting side effects to see if we could adjust the dose of switch back to atenolol. Diltiazem caused him to have swelling before.    Meek Edmond MD [facility]  Cardiology - Electrophysiology

## 2017-04-06 LAB — INTERPRETATION ECG - MUSE: NORMAL

## 2017-04-10 ENCOUNTER — TELEPHONE (OUTPATIENT)
Dept: OTHER | Facility: CLINIC | Age: 62
End: 2017-04-10

## 2017-04-10 DIAGNOSIS — I48.91 ATRIAL FIBRILLATION, UNSPECIFIED TYPE (H): ICD-10-CM

## 2017-04-10 RX ORDER — ATENOLOL 50 MG/1
50 TABLET ORAL 2 TIMES DAILY
Qty: 180 TABLET | Refills: 3 | Status: SHIPPED | OUTPATIENT
Start: 2017-04-10

## 2017-04-10 NOTE — TELEPHONE ENCOUNTER
Patient reported having headaches and not feeling well on Metoprolol. He stopped medication. We will have him switch back to atenolol at a higher dose. We instructed him to take 50 mg BID. He reports he has a pacemaker/AVN ablation scheduled at OSH closer to his home.     JASIEL Novoa CNP  Electrophysiology Consult Service  Pager: [phone]

## 2017-05-11 ENCOUNTER — TELEPHONE (OUTPATIENT)
Dept: CARDIOLOGY | Facility: CLINIC | Age: 62
End: 2017-05-11

## 2017-06-05 ENCOUNTER — TELEPHONE (OUTPATIENT)
Dept: CARDIOLOGY | Facility: CLINIC | Age: 62
End: 2017-06-05

## 2017-06-14 ENCOUNTER — TELEPHONE (OUTPATIENT)
Dept: CARDIOLOGY | Facility: CLINIC | Age: 62
End: 2017-06-14

## 2017-09-11 ENCOUNTER — OFFICE VISIT (OUTPATIENT)
Dept: GASTROENTEROLOGY | Facility: CLINIC | Age: 62
End: 2017-09-11
Attending: INTERNAL MEDICINE
Payer: COMMERCIAL

## 2017-09-11 ENCOUNTER — TELEPHONE (OUTPATIENT)
Dept: GASTROENTEROLOGY | Facility: CLINIC | Age: 62
End: 2017-09-11

## 2017-09-11 VITALS
HEART RATE: 136 BPM | WEIGHT: 194.4 LBS | DIASTOLIC BLOOD PRESSURE: 99 MMHG | HEIGHT: 70 IN | TEMPERATURE: 98.3 F | BODY MASS INDEX: 27.83 KG/M2 | SYSTOLIC BLOOD PRESSURE: 138 MMHG | OXYGEN SATURATION: 95 %

## 2017-09-11 DIAGNOSIS — K74.60 CIRRHOSIS OF LIVER WITHOUT ASCITES, UNSPECIFIED HEPATIC CIRRHOSIS TYPE (H): ICD-10-CM

## 2017-09-11 DIAGNOSIS — K74.60 CIRRHOSIS OF LIVER WITHOUT ASCITES, UNSPECIFIED HEPATIC CIRRHOSIS TYPE (H): Primary | ICD-10-CM

## 2017-09-11 LAB
AFP SERPL-MCNC: 3.7 UG/L (ref 0–8)
ALBUMIN SERPL-MCNC: 4 G/DL (ref 3.4–5)
ALP SERPL-CCNC: 130 U/L (ref 40–150)
ALT SERPL W P-5'-P-CCNC: 60 U/L (ref 0–70)
ANION GAP SERPL CALCULATED.3IONS-SCNC: 7 MMOL/L (ref 3–14)
AST SERPL W P-5'-P-CCNC: 48 U/L (ref 0–45)
BILIRUB DIRECT SERPL-MCNC: 0.6 MG/DL (ref 0–0.2)
BILIRUB SERPL-MCNC: 2.1 MG/DL (ref 0.2–1.3)
BUN SERPL-MCNC: 18 MG/DL (ref 7–30)
CALCIUM SERPL-MCNC: 9.2 MG/DL (ref 8.5–10.1)
CHLORIDE SERPL-SCNC: 99 MMOL/L (ref 94–109)
CO2 SERPL-SCNC: 30 MMOL/L (ref 20–32)
CREAT SERPL-MCNC: 1.01 MG/DL (ref 0.66–1.25)
ERYTHROCYTE [DISTWIDTH] IN BLOOD BY AUTOMATED COUNT: 13.6 % (ref 10–15)
GFR SERPL CREATININE-BSD FRML MDRD: 74 ML/MIN/1.7M2
GLUCOSE SERPL-MCNC: 142 MG/DL (ref 70–99)
HCT VFR BLD AUTO: 40.8 % (ref 40–53)
HGB BLD-MCNC: 13.8 G/DL (ref 13.3–17.7)
INR PPP: 1.91 (ref 0.86–1.14)
MCH RBC QN AUTO: 31.7 PG (ref 26.5–33)
MCHC RBC AUTO-ENTMCNC: 33.8 G/DL (ref 31.5–36.5)
MCV RBC AUTO: 94 FL (ref 78–100)
PLATELET # BLD AUTO: 113 10E9/L (ref 150–450)
POTASSIUM SERPL-SCNC: 3.7 MMOL/L (ref 3.4–5.3)
PROT SERPL-MCNC: 8.3 G/DL (ref 6.8–8.8)
RBC # BLD AUTO: 4.35 10E12/L (ref 4.4–5.9)
SODIUM SERPL-SCNC: 136 MMOL/L (ref 133–144)
WBC # BLD AUTO: 5.4 10E9/L (ref 4–11)

## 2017-09-11 PROCEDURE — 80048 BASIC METABOLIC PNL TOTAL CA: CPT | Performed by: INTERNAL MEDICINE

## 2017-09-11 PROCEDURE — 82105 ALPHA-FETOPROTEIN SERUM: CPT | Performed by: INTERNAL MEDICINE

## 2017-09-11 PROCEDURE — 90670 PCV13 VACCINE IM: CPT | Mod: ZF | Performed by: INTERNAL MEDICINE

## 2017-09-11 PROCEDURE — 80076 HEPATIC FUNCTION PANEL: CPT | Performed by: INTERNAL MEDICINE

## 2017-09-11 PROCEDURE — 99212 OFFICE O/P EST SF 10 MIN: CPT | Mod: 25,ZF

## 2017-09-11 PROCEDURE — 25000128 H RX IP 250 OP 636: Mod: ZF | Performed by: INTERNAL MEDICINE

## 2017-09-11 PROCEDURE — 85027 COMPLETE CBC AUTOMATED: CPT | Performed by: INTERNAL MEDICINE

## 2017-09-11 PROCEDURE — G0009 ADMIN PNEUMOCOCCAL VACCINE: HCPCS | Mod: ZF

## 2017-09-11 PROCEDURE — 85610 PROTHROMBIN TIME: CPT | Performed by: INTERNAL MEDICINE

## 2017-09-11 PROCEDURE — 36415 COLL VENOUS BLD VENIPUNCTURE: CPT | Performed by: INTERNAL MEDICINE

## 2017-09-11 RX ADMIN — PNEUMOCOCCAL 13-VALENT CONJUGATE VACCINE 0.5 ML: 2.2; 2.2; 2.2; 2.2; 2.2; 4.4; 2.2; 2.2; 2.2; 2.2; 2.2; 2.2; 2.2 INJECTION, SUSPENSION INTRAMUSCULAR at 14:30

## 2017-09-11 ASSESSMENT — PAIN SCALES - GENERAL: PAINLEVEL: NO PAIN (0)

## 2017-09-11 NOTE — NURSING NOTE
Chief Complaint   Patient presents with     RECHECK     Cirrhosis of Liver without ascites   Pt roomed, vitals, meds, and allergies reviewed with pt. Pt ready for provider.  Jose Rogel, CMA

## 2017-09-11 NOTE — PROGRESS NOTES
HISTORY OF PRESENT ILLNESS:  I had the pleasure of seeing Dwight Sanchez for followup in the Liver Clinic at the [facility] on 09/11/2017.  Mr. 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  He is a sustained virologic responder to hepatitis C treatment.      He is largely unchanged at this visit.  He denies any abdominal pain, itching or skin rash but does complain of fatigue which certainly limits him from doing all that he wants to do.  He denies any increased abdominal girth or lower extremity edema.  He denies any fevers or chills, cough or shortness of breath.  He denies any nausea or vomiting, diarrhea or constipation.  His appetite has been good, and his weight largely has been the same.        He does have chronic atrial fibrillation, and they are talking about putting a pacemaker in as he has been unable to be maintained in a sustained sinus rhythm.     Current Outpatient Prescriptions   Medication     atenolol (TENORMIN) 50 MG tablet     lisinopril (PRINIVIL,ZESTRIL) 20 MG tablet     rivaroxaban ANTICOAGULANT (XARELTO) 20 MG TABS tablet     furosemide (LASIX) 20 MG tablet     vitamin E 400 UNIT capsule     diazepam (VALIUM) 5 MG tablet     No current facility-administered medications for this visit.      B/P: 138/99, T: 98.3, P: 136, R: Data Unavailable    HEENT exam shows no scleral icterus and no temporal muscle wasting.  His chest is clear.  His abdominal exam shows no increase in girth.  No masses or tenderness to palpation are present.  His liver is 10-11 cm in span with a prominent left lobe.  No spleen tip is palpable, and extremity exam shows no edema.  Skin exam shows no stigmata of chronic liver disease.  Neurologic exam shows no asterixis.     Recent Results (from the past 168 hour(s))    Hepatic Panel [LAB20]    Collection Time: 09/11/17 12:42 PM   Result Value Ref Range    Bilirubin Direct 0.6 (H) 0.0 - 0.2 mg/dL    Bilirubin Total 2.1 (H)  0.2 - 1.3 mg/dL    Albumin 4.0 3.4 - 5.0 g/dL    Protein Total 8.3 6.8 - 8.8 g/dL    Alkaline Phosphatase 130 40 - 150 U/L    ALT 60 0 - 70 U/L    AST 48 (H) 0 - 45 U/L   Basic metabolic panel [LAB15]    Collection Time: 09/11/17 12:42 PM   Result Value Ref Range    Sodium 136 133 - 144 mmol/L    Potassium 3.7 3.4 - 5.3 mmol/L    Chloride 99 94 - 109 mmol/L    Carbon Dioxide 30 20 - 32 mmol/L    Anion Gap 7 3 - 14 mmol/L    Glucose 142 (H) 70 - 99 mg/dL    Urea Nitrogen 18 7 - 30 mg/dL    Creatinine 1.01 0.66 - 1.25 mg/dL    GFR Estimate 74 >60 mL/min/1.7m2    GFR Estimate If Black >90 >60 mL/min/1.7m2    Calcium 9.2 8.5 - 10.1 mg/dL   CBC with platelets [ZWV633]    Collection Time: 09/11/17 12:42 PM   Result Value Ref Range    WBC 5.4 4.0 - 11.0 10e9/L    RBC Count 4.35 (L) 4.4 - 5.9 10e12/L    Hemoglobin 13.8 13.3 - 17.7 g/dL    Hematocrit 40.8 40.0 - 53.0 %    MCV 94 78 - 100 fl    MCH 31.7 26.5 - 33.0 pg    MCHC 33.8 31.5 - 36.5 g/dL    RDW 13.6 10.0 - 15.0 %    Platelet Count 113 (L) 150 - 450 10e9/L   INR [[id number]]    Collection Time: 09/11/17 12:42 PM   Result Value Ref Range    INR 1.91 (H) 0.86 - 1.14   AFP tumor marker [[id number]]    Collection Time: 09/11/17 12:42 PM   Result Value Ref Range    Alpha Fetoprotein 3.7 0 - 8 ug/L      He did have an MRI in April that showed no abnormal areas of enhancement and no ascites.      IMPRESSION:  My impression is that Mr. 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  He is a sustained virologic responder to hepatitis C treatment.  I will get him a Prevnar 13 vaccine which will bring up-to-date on a vaccines.  He is up-to-date with regard to variceal screening and HCC screening.  I will go to having him get an ultrasound alternating with an MRI every other month for his HCC screening.  He is up-to-date with regard to variceal screening as well and I will see him back in the clinic in 6 months.      Thank you very much for allowing me to participate in the care  of this patient.  If you have any questions regarding my recommendations, please do not hesitate to contact me.       Dwight Zamudio MD      Professor of Medicine  [employer]      Executive Medical Director, Solid Organ Transplant Program  [facility]

## 2017-09-11 NOTE — NURSING NOTE
Pneumococcal 13 given per Dr. Zamudio orders, pt's first name, last name and  verified prior to administration, injection given without complications or questions, see MAR for details of immunization.    Abril Duff [facility]  2017 2:30 PM

## 2017-09-11 NOTE — MR AVS SNAPSHOT
After Visit Summary   9/11/2017    Dwight Sanchez    MRN: [id number]           Patient Information     Date Of Birth          1955        Visit Information        Provider Department      9/11/2017 2:00 PM Dwight Zamudio MD M [facility] Hepatology        Today's Diagnoses     Cirrhosis of liver without ascites, unspecified hepatic cirrhosis type (H)    -  1       Follow-ups after your visit        Additional Services     GASTROENTEROLOGY ADULT REF PROCEDURE ONLY       Last Lab Result: Creatinine (mg/dL)       Date                     Value                 09/11/2017               1.01             ----------  Body mass index is 27.89 kg/(m^2).     Needed:  No  Language:  English    Patient will be contacted to schedule procedure.     Please be aware that coverage of these services is subject to the terms and limitations of your health insurance plan.  Call member services at your health plan with any benefit or coverage questions.  Any procedures must be performed at a [place] facility OR coordinated by your clinic's referral office.    Please bring the following with you to your appointment:    (1) Any X-Rays, CTs or MRIs which have been performed.  Contact the facility where they were done to arrange for  prior to your scheduled appointment.    (2) List of current medications   (3) This referral request   (4) Any documents/labs given to you for this referral                  Your next 10 appointments already scheduled     Mar 13, 2018  7:30 AM CDT   Lab with  LAB   [facility])    [address] [place] [address] [phone]            Mar 13, 2018  8:30 AM CDT   (Arrive by 8:15 AM)   Return General Liver with MD JENNIFER Wade [facility] Hepatology ([facility])    [address] [place] [address] [phone]              Future tests that were ordered for you  today     Open Standing Orders        Priority Remaining Interval Expires Ordered    US abdomen complete [BFB452] Routine 2/2 Every 6 Months 9/11/2018 9/11/2017          Open Future Orders        Priority Expected Expires Ordered    Hepatic Panel [LAB20] Routine 3/10/2018 9/11/2018 9/11/2017    Basic metabolic panel [LAB15] Routine 3/10/2018 9/11/2018 9/11/2017    CBC with platelets [GBG696] Routine 3/10/2018 9/11/2018 9/11/2017    INR [VUQ5130] Routine 3/10/2018 9/11/2018 9/11/2017    AFP tumor marker [MYU655] Routine 3/10/2018 9/11/2018 9/11/2017    US Abdomen Limited Routine  9/11/2018 9/11/2017            Who to contact     If you have questions or need follow up information about today's clinic visit or your schedule please contact [facility] HEPATOLOGY directly at [phone].  Normal or non-critical lab and imaging results will be communicated to you by [employer], letter or phone within 4 business days after the clinic has received the results. If you do not hear from us within 7 days, please contact the clinic through [employer] or phone. If you have a critical or abnormal lab result, we will notify you by phone as soon as possible.  Submit refill requests through [employer] or call your pharmacy and they will forward the refill request to us. Please allow 3 business days for your refill to be completed.          Additional Information About Your Visit        [employer] Information     [employer] gives you secure access to your electronic health record. If you see a primary care provider, you can also send messages to your care team and make appointments. If you have questions, please call your primary care clinic.  If you do not have a primary care provider, please call [phone] and they will assist you.        Care EveryWhere ID     This is your Care EveryWhere ID. This could be used by other organizations to access your [place] medical records  [id number]        Your Vitals Were     Pulse Temperature Height Pulse  "Oximetry BMI (Body Mass Index)       136 98.3  F (36.8  C) (Oral) 1.778 m (5' 10\") 95% 27.89 kg/m2        Blood Pressure from Last 3 Encounters:   09/11/17 (!) 138/99   04/05/17 138/88   03/13/17 121/86    Weight from Last 3 Encounters:   09/11/17 88.2 kg (194 lb 6.4 oz)   04/05/17 88.4 kg (194 lb 14.4 oz)   03/13/17 86.5 kg (190 lb 12.8 oz)              We Performed the Following     GASTROENTEROLOGY ADULT REF PROCEDURE ONLY     Schedule follow up appointments        Primary Care Provider Office Phone # Fax #    Hector JUAREZ [place] [phone]       [facility] [address]  [facility] [address]        Equal Access to Services     GIANFRANCO ARRIAGA : Hadii aad ku hadasho Sorajwinder, waaxda luqadaha, qaybta kaalmada adeegyada, eleni franco haygwen guzman . So [facility] [phone].    ATENCIÓN: Si habla español, tiene a ledesma disposición servicios gratuitos de asistencia lingüística. Makayla al [phone].    We comply with applicable federal civil rights laws and [place] laws. We do not discriminate on the basis of race, color, national origin, age, disability sex, sexual orientation or gender identity.            Thank you!     Thank you for choosing [facility] HEPATOLOGY  for your care. Our goal is always to provide you with excellent care. Hearing back from our patients is one way we can continue to improve our services. Please take a few minutes to complete the written survey that you may receive in the mail after your visit with us. Thank you!             Your Updated Medication List - Protect others around you: Learn how to safely use, store and throw away your medicines at www.disposemymeds.org.          This list is accurate as of: 9/11/17  2:25 PM.  Always use your most recent med list.                   Brand Name Dispense Instructions for use Diagnosis    atenolol 50 MG tablet    TENORMIN    180 tablet    Take 1 tablet (50 mg) by mouth 2 times daily    Atrial fibrillation, unspecified type (H) "       diazepam 5 MG tablet    VALIUM    4 tablet    Take 2 tablets (10 mg) by mouth every 6 hours as needed for anxiety (prior to MRI scan)    Anxiety       furosemide 20 MG tablet    LASIX    62 tablet    Take 1 tablet (20 mg) by mouth 2 times daily    Atrial fibrillation (H)       lisinopril 20 MG tablet    PRINIVIL/ZESTRIL    90 tablet    Take 1 tablet (20 mg) by mouth daily    Atrial fibrillation (H)       rivaroxaban ANTICOAGULANT 20 MG Tabs tablet    XARELTO    90 tablet    Take 1 tablet (20 mg) by mouth daily (with dinner)    Paroxysmal atrial fibrillation (H)       vitamin E 400 UNIT capsule     100 capsule    Take 1 capsule by mouth daily.    Atrial fibrillation (H)

## 2017-09-11 NOTE — LETTER
9/11/2017      RE: Dwight Sanchez  [address] [address] [address]       HISTORY OF PRESENT ILLNESS:  I had the pleasure of seeing Dwight Sanchez for followup in the Liver Clinic at the [facility] [place] on 09/11/2017.  Mr. 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  He is a sustained virologic responder to hepatitis C treatment.      He is largely unchanged at this visit.  He denies any abdominal pain, itching or skin rash but does complain of fatigue which certainly limits him from doing all that he wants to do.  He denies any increased abdominal girth or lower extremity edema.  He denies any fevers or chills, cough or shortness of breath.  He denies any nausea or vomiting, diarrhea or constipation.  His appetite has been good, and his weight largely has been the same.        He does have chronic atrial fibrillation, and they are talking about putting a pacemaker in as he has been unable to be maintained in a sustained sinus rhythm.     Current Outpatient Prescriptions   Medication     atenolol (TENORMIN) 50 MG tablet     lisinopril (PRINIVIL,ZESTRIL) 20 MG tablet     rivaroxaban ANTICOAGULANT (XARELTO) 20 MG TABS tablet     furosemide (LASIX) 20 MG tablet     vitamin E 400 UNIT capsule     diazepam (VALIUM) 5 MG tablet     No current facility-administered medications for this visit.      B/P: 138/99, T: 98.3, P: 136, R: Data Unavailable    HEENT exam shows no scleral icterus and no temporal muscle wasting.  His chest is clear.  His abdominal exam shows no increase in girth.  No masses or tenderness to palpation are present.  His liver is 10-11 cm in span with a prominent left lobe.  No spleen tip is palpable, and extremity exam shows no edema.  Skin exam shows no stigmata of chronic liver disease.  Neurologic exam shows no asterixis.     Recent Results (from the past 168 hour(s))    Hepatic Panel [LAB20]    Collection Time: 09/11/17 12:42 PM    Result Value Ref Range    Bilirubin Direct 0.6 (H) 0.0 - 0.2 mg/dL    Bilirubin Total 2.1 (H) 0.2 - 1.3 mg/dL    Albumin 4.0 3.4 - 5.0 g/dL    Protein Total 8.3 6.8 - 8.8 g/dL    Alkaline Phosphatase 130 40 - 150 U/L    ALT 60 0 - 70 U/L    AST 48 (H) 0 - 45 U/L   Basic metabolic panel [LAB15]    Collection Time: 09/11/17 12:42 PM   Result Value Ref Range    Sodium 136 133 - 144 mmol/L    Potassium 3.7 3.4 - 5.3 mmol/L    Chloride 99 94 - 109 mmol/L    Carbon Dioxide 30 20 - 32 mmol/L    Anion Gap 7 3 - 14 mmol/L    Glucose 142 (H) 70 - 99 mg/dL    Urea Nitrogen 18 7 - 30 mg/dL    Creatinine 1.01 0.66 - 1.25 mg/dL    GFR Estimate 74 >60 mL/min/1.7m2    GFR Estimate If Black >90 >60 mL/min/1.7m2    Calcium 9.2 8.5 - 10.1 mg/dL   CBC with platelets [LXQ584]    Collection Time: 09/11/17 12:42 PM   Result Value Ref Range    WBC 5.4 4.0 - 11.0 10e9/L    RBC Count 4.35 (L) 4.4 - 5.9 10e12/L    Hemoglobin 13.8 13.3 - 17.7 g/dL    Hematocrit 40.8 40.0 - 53.0 %    MCV 94 78 - 100 fl    MCH 31.7 26.5 - 33.0 pg    MCHC 33.8 31.5 - 36.5 g/dL    RDW 13.6 10.0 - 15.0 %    Platelet Count 113 (L) 150 - 450 10e9/L   INR [[id number]]    Collection Time: 09/11/17 12:42 PM   Result Value Ref Range    INR 1.91 (H) 0.86 - 1.14   AFP tumor marker [HGX686]    Collection Time: 09/11/17 12:42 PM   Result Value Ref Range    Alpha Fetoprotein 3.7 0 - 8 ug/L      He did have an MRI in April that showed no abnormal areas of enhancement and no ascites.      IMPRESSION:  My impression is that Mr. 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  He is a sustained virologic responder to hepatitis C treatment.  I will get him a Prevnar 13 vaccine which will bring up-to-date on a vaccines.  He is up-to-date with regard to variceal screening and HCC screening.  I will go to having him get an ultrasound alternating with an MRI every other month for his HCC screening.  He is up-to-date with regard to variceal screening as well and I will see him back  in the clinic in 6 months.      Thank you very much for allowing me to participate in the care of this patient.  If you have any questions regarding my recommendations, please do not hesitate to contact me.       Dwight Zamudio MD      Professor of Medicine  [employer]      Executive Medical Director, Solid Organ Transplant Program  [facility]

## 2017-09-11 NOTE — LETTER
9/11/2017       RE: Dwight Sanchez  [address] [address]     Dear Colleague,    Thank you for referring your patient, Dwight Sanchez, to the [facility] HEPATOLOGY at [facility]. Please see a copy of my visit note below.    HISTORY OF PRESENT ILLNESS:  I had the pleasure of seeing Dwight Sanchez for followup in the Liver Clinic at the [facility] on 09/11/2017.  Mr. 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  He is a sustained virologic responder to hepatitis C treatment.      He is largely unchanged at this visit.  He denies any abdominal pain, itching or skin rash but does complain of fatigue which certainly limits him from doing all that he wants to do.  He denies any increased abdominal girth or lower extremity edema.  He denies any fevers or chills, cough or shortness of breath.  He denies any nausea or vomiting, diarrhea or constipation.  His appetite has been good, and his weight largely has been the same.        He does have chronic atrial fibrillation, and they are talking about putting a pacemaker in as he has been unable to be maintained in a sustained sinus rhythm.     Current Outpatient Prescriptions   Medication     atenolol (TENORMIN) 50 MG tablet     lisinopril (PRINIVIL,ZESTRIL) 20 MG tablet     rivaroxaban ANTICOAGULANT (XARELTO) 20 MG TABS tablet     furosemide (LASIX) 20 MG tablet     vitamin E 400 UNIT capsule     diazepam (VALIUM) 5 MG tablet     No current facility-administered medications for this visit.      B/P: 138/99, T: 98.3, P: 136, R: Data Unavailable    HEENT exam shows no scleral icterus and no temporal muscle wasting.  His chest is clear.  His abdominal exam shows no increase in girth.  No masses or tenderness to palpation are present.  His liver is 10-11 cm in span with a prominent left lobe.  No spleen tip is palpable, and extremity exam shows no edema.  Skin exam  shows no stigmata of chronic liver disease.  Neurologic exam shows no asterixis.     Recent Results (from the past 168 hour(s))    Hepatic Panel [LAB20]    Collection Time: 09/11/17 12:42 PM   Result Value Ref Range    Bilirubin Direct 0.6 (H) 0.0 - 0.2 mg/dL    Bilirubin Total 2.1 (H) 0.2 - 1.3 mg/dL    Albumin 4.0 3.4 - 5.0 g/dL    Protein Total 8.3 6.8 - 8.8 g/dL    Alkaline Phosphatase 130 40 - 150 U/L    ALT 60 0 - 70 U/L    AST 48 (H) 0 - 45 U/L   Basic metabolic panel [LAB15]    Collection Time: 09/11/17 12:42 PM   Result Value Ref Range    Sodium 136 133 - 144 mmol/L    Potassium 3.7 3.4 - 5.3 mmol/L    Chloride 99 94 - 109 mmol/L    Carbon Dioxide 30 20 - 32 mmol/L    Anion Gap 7 3 - 14 mmol/L    Glucose 142 (H) 70 - 99 mg/dL    Urea Nitrogen 18 7 - 30 mg/dL    Creatinine 1.01 0.66 - 1.25 mg/dL    GFR Estimate 74 >60 mL/min/1.7m2    GFR Estimate If Black >90 >60 mL/min/1.7m2    Calcium 9.2 8.5 - 10.1 mg/dL   CBC with platelets [[id number]]    Collection Time: 09/11/17 12:42 PM   Result Value Ref Range    WBC 5.4 4.0 - 11.0 10e9/L    RBC Count 4.35 (L) 4.4 - 5.9 10e12/L    Hemoglobin 13.8 13.3 - 17.7 g/dL    Hematocrit 40.8 40.0 - 53.0 %    MCV 94 78 - 100 fl    MCH 31.7 26.5 - 33.0 pg    MCHC 33.8 31.5 - 36.5 g/dL    RDW 13.6 10.0 - 15.0 %    Platelet Count 113 (L) 150 - 450 10e9/L   INR [[id number]]    Collection Time: 09/11/17 12:42 PM   Result Value Ref Range    INR 1.91 (H) 0.86 - 1.14   AFP tumor marker [QCE782]    Collection Time: 09/11/17 12:42 PM   Result Value Ref Range    Alpha Fetoprotein 3.7 0 - 8 ug/L      He did have an MRI in April that showed no abnormal areas of enhancement and no ascites.      IMPRESSION:  My impression is that Mr. 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  He is a sustained virologic responder to hepatitis C treatment.  I will get him a Prevnar 13 vaccine which will bring up-to-date on a vaccines.  He is up-to-date with regard to variceal screening and HCC screening.   I will go to having him get an ultrasound alternating with an MRI every other month for his HCC screening.  He is up-to-date with regard to variceal screening as well and I will see him back in the clinic in 6 months.      Thank you very much for allowing me to participate in the care of this patient.  If you have any questions regarding my recommendations, please do not hesitate to contact me.       Dwight Zamudio MD      Professor of Medicine  [employer]      Executive Medical Director, Solid Organ Transplant Program  [facility]

## 2017-10-02 ENCOUNTER — TELEPHONE (OUTPATIENT)
Dept: GASTROENTEROLOGY | Facility: CLINIC | Age: 62
End: 2017-10-02

## 2017-10-02 NOTE — TELEPHONE ENCOUNTER
Patient scheduled for EGD    Indication for procedure. Esophageal varices    Referring Provider. Dwight Zamudio MD, Hector Villar    ? Not Needed    Arrival time verified? Yes, 0830    Facility location verified? Yes, [address]    Instructions given regarding prep and procedure    Prep Type NPO    Are you taking any anticoagulants or blood thinners? Xarelto    Instructions given? Patient consulting PCP for management with procedure    Electronic implanted devices? No    Pre procedure teaching completed? Yes    Transportation from procedure? Son or Wife    H&P / Pre op physical completed? N/A

## 2017-10-24 DIAGNOSIS — I48.91 ATRIAL FIBRILLATION (H): ICD-10-CM

## 2017-10-24 RX ORDER — LISINOPRIL 20 MG/1
TABLET ORAL
Qty: 90 TABLET | Refills: 1 | Status: SHIPPED | OUTPATIENT
Start: 2017-10-24 | End: 2018-04-22

## 2018-03-20 ENCOUNTER — RADIANT APPOINTMENT (OUTPATIENT)
Dept: ULTRASOUND IMAGING | Facility: CLINIC | Age: 63
End: 2018-03-20
Attending: INTERNAL MEDICINE
Payer: COMMERCIAL

## 2018-03-20 ENCOUNTER — OFFICE VISIT (OUTPATIENT)
Dept: GASTROENTEROLOGY | Facility: CLINIC | Age: 63
End: 2018-03-20
Attending: INTERNAL MEDICINE
Payer: COMMERCIAL

## 2018-03-20 VITALS
HEIGHT: 70 IN | TEMPERATURE: 98.2 F | DIASTOLIC BLOOD PRESSURE: 94 MMHG | BODY MASS INDEX: 27.43 KG/M2 | HEART RATE: 79 BPM | SYSTOLIC BLOOD PRESSURE: 152 MMHG | WEIGHT: 191.6 LBS

## 2018-03-20 DIAGNOSIS — K74.60 CIRRHOSIS OF LIVER WITHOUT ASCITES, UNSPECIFIED HEPATIC CIRRHOSIS TYPE (H): ICD-10-CM

## 2018-03-20 DIAGNOSIS — K74.60 CIRRHOSIS OF LIVER WITHOUT ASCITES, UNSPECIFIED HEPATIC CIRRHOSIS TYPE (H): Primary | ICD-10-CM

## 2018-03-20 LAB
AFP SERPL-MCNC: 4 UG/L (ref 0–8)
ALBUMIN SERPL-MCNC: 4.1 G/DL (ref 3.4–5)
ALP SERPL-CCNC: 110 U/L (ref 40–150)
ALT SERPL W P-5'-P-CCNC: 52 U/L (ref 0–70)
ANION GAP SERPL CALCULATED.3IONS-SCNC: 8 MMOL/L (ref 3–14)
AST SERPL W P-5'-P-CCNC: 40 U/L (ref 0–45)
BILIRUB DIRECT SERPL-MCNC: 0.3 MG/DL (ref 0–0.2)
BILIRUB SERPL-MCNC: 1.2 MG/DL (ref 0.2–1.3)
BUN SERPL-MCNC: 18 MG/DL (ref 7–30)
CALCIUM SERPL-MCNC: 9.1 MG/DL (ref 8.5–10.1)
CHLORIDE SERPL-SCNC: 103 MMOL/L (ref 94–109)
CO2 SERPL-SCNC: 27 MMOL/L (ref 20–32)
CREAT SERPL-MCNC: 0.96 MG/DL (ref 0.66–1.25)
ERYTHROCYTE [DISTWIDTH] IN BLOOD BY AUTOMATED COUNT: 13.6 % (ref 10–15)
GFR SERPL CREATININE-BSD FRML MDRD: 78 ML/MIN/1.7M2
GLUCOSE SERPL-MCNC: 132 MG/DL (ref 70–99)
HCT VFR BLD AUTO: 44.8 % (ref 40–53)
HGB BLD-MCNC: 15.1 G/DL (ref 13.3–17.7)
INR PPP: 1.9 (ref 0.86–1.14)
MCH RBC QN AUTO: 31.9 PG (ref 26.5–33)
MCHC RBC AUTO-ENTMCNC: 33.7 G/DL (ref 31.5–36.5)
MCV RBC AUTO: 95 FL (ref 78–100)
PLATELET # BLD AUTO: 107 10E9/L (ref 150–450)
POTASSIUM SERPL-SCNC: 4 MMOL/L (ref 3.4–5.3)
PROT SERPL-MCNC: 7.9 G/DL (ref 6.8–8.8)
RBC # BLD AUTO: 4.74 10E12/L (ref 4.4–5.9)
SODIUM SERPL-SCNC: 138 MMOL/L (ref 133–144)
WBC # BLD AUTO: 5.2 10E9/L (ref 4–11)

## 2018-03-20 PROCEDURE — 80076 HEPATIC FUNCTION PANEL: CPT | Performed by: INTERNAL MEDICINE

## 2018-03-20 PROCEDURE — 85610 PROTHROMBIN TIME: CPT | Performed by: INTERNAL MEDICINE

## 2018-03-20 PROCEDURE — 85027 COMPLETE CBC AUTOMATED: CPT | Performed by: INTERNAL MEDICINE

## 2018-03-20 PROCEDURE — 80048 BASIC METABOLIC PNL TOTAL CA: CPT | Performed by: INTERNAL MEDICINE

## 2018-03-20 PROCEDURE — G0463 HOSPITAL OUTPT CLINIC VISIT: HCPCS | Mod: ZF

## 2018-03-20 PROCEDURE — 82105 ALPHA-FETOPROTEIN SERUM: CPT | Performed by: INTERNAL MEDICINE

## 2018-03-20 PROCEDURE — 36415 COLL VENOUS BLD VENIPUNCTURE: CPT | Performed by: INTERNAL MEDICINE

## 2018-03-20 ASSESSMENT — PAIN SCALES - GENERAL: PAINLEVEL: NO PAIN (0)

## 2018-03-20 NOTE — NURSING NOTE
"Chief Complaint   Patient presents with     RECHECK     follow up with cirrhosis, tzimmer cma       Initial BP (!) 152/94  Pulse 79  Temp 98.2  F (36.8  C) (Oral)  Ht 1.778 m (5' 10\")  Wt 86.9 kg (191 lb 9.6 oz)  BMI 27.49 kg/m2 Estimated body mass index is 27.49 kg/(m^2) as calculated from the following:    Height as of this encounter: 1.778 m (5' 10\").    Weight as of this encounter: 86.9 kg (191 lb 9.6 oz).  Medication Reconciliation: complete    "

## 2018-03-20 NOTE — MR AVS SNAPSHOT
After Visit Summary   3/20/2018    Dwight Sanchez    MRN: [id number]           Patient Information     Date Of Birth          1955        Visit Information        Provider Department      3/20/2018 8:45 AM Dwight Zamudio MD [facility] Hepatology        Today's Diagnoses     Cirrhosis of liver without ascites, unspecified hepatic cirrhosis type (H)    -  1       Follow-ups after your visit        Follow-up notes from your care team     Return in about 6 months (around 9/20/2018).      Your next 10 appointments already scheduled     Sep 25, 2018  9:30 AM CDT   Lab with  LAB   [facility] Lab ([facility])    [address]  1st Floor  [place] [id number] [phone]            Sep 25, 2018 11:00 AM CDT   (Arrive by 10:45 AM)   Return General Liver with Dwight Zamudio MD   [facility] Hepatology ([facility])    [address]  [address]  [place] [address] [phone]              Future tests that were ordered for you today     Open Future Orders        Priority Expected Expires Ordered    Hepatic Panel [LAB20] Routine 9/16/2018 3/20/2019 3/20/2018    Basic metabolic panel [LAB15] Routine 9/16/2018 3/20/2019 3/20/2018    CBC with platelets [ARQ531] Routine 9/16/2018 3/20/2019 3/20/2018    INR [IBI2311] Routine 9/16/2018 3/20/2019 3/20/2018    AFP tumor marker [BOC017] Routine 9/16/2018 3/20/2019 3/20/2018    MRI Abdomen w/o contrast Routine  4/19/2018 3/20/2018    Dexa hip/pelvis/spine* Routine  4/19/2018 3/20/2018            Who to contact     If you have questions or need follow up information about today's clinic visit or your schedule please contact [facility] HEPATOLOGY directly at [phone].  Normal or non-critical lab and imaging results will be communicated to you by MyChart, letter or phone within 4 business days after the clinic has received the results. If you do not hear from us within 7 days, please contact the  "clinic through [employer] or phone. If you have a critical or abnormal lab result, we will notify you by phone as soon as possible.  Submit refill requests through [employer] or call your pharmacy and they will forward the refill request to us. Please allow 3 business days for your refill to be completed.          Additional Information About Your Visit        [employer] Information     [employer] gives you secure access to your electronic health record. If you see a primary care provider, you can also send messages to your care team and make appointments. If you have questions, please call your primary care clinic.  If you do not have a primary care provider, please call [phone] and they will assist you.        Care EveryWhere ID     This is your Care EveryWhere ID. This could be used by other organizations to access your [place] medical records  [id number]        Your Vitals Were     Pulse Temperature Height BMI (Body Mass Index)          79 98.2  F (36.8  C) (Oral) 1.778 m (5' 10\") 27.49 kg/m2         Blood Pressure from Last 3 Encounters:   03/20/18 (!) 152/94   09/11/17 (!) 138/99   04/05/17 138/88    Weight from Last 3 Encounters:   03/20/18 86.9 kg (191 lb 9.6 oz)   09/11/17 88.2 kg (194 lb 6.4 oz)   04/05/17 88.4 kg (194 lb 14.4 oz)              We Performed the Following     Schedule follow up appointments          Today's Medication Changes          These changes are accurate as of 3/20/18  9:27 AM.  If you have any questions, ask your nurse or doctor.               Stop taking these medicines if you haven't already. Please contact your care team if you have questions.     diazepam 5 MG tablet   Commonly known as:  VALIUM   Stopped by:  Dwigth Zamudio MD                    Primary Care Provider Office Phone # Fax #    Hector Villar [phone]       [facility] [address] [facility] [address]        Equal Access to Services     [facility] BART AH: michael Gee, " karen tiffanyaleenahero quevedoeleni queen barryin hayaan adeeg kharash la'aan ah. [facility] [phone].    ATENCIÓN: Si min calderon, tiene a ledesma disposición servicios gratuitos de asistencia lingüística. Makayla al [phone].    We comply with applicable federal civil rights laws and [place] laws. We do not discriminate on the basis of race, color, national origin, age, disability, sex, sexual orientation, or gender identity.            Thank you!     Thank you for choosing [facility] HEPATOLOGY  for your care. Our goal is always to provide you with excellent care. Hearing back from our patients is one way we can continue to improve our services. Please take a few minutes to complete the written survey that you may receive in the mail after your visit with us. Thank you!             Your Updated Medication List - Protect others around you: Learn how to safely use, store and throw away your medicines at www.disposemymeds.org.          This list is accurate as of 3/20/18  9:27 AM.  Always use your most recent med list.                   Brand Name Dispense Instructions for use Diagnosis    atenolol 50 MG tablet    TENORMIN    180 tablet    Take 1 tablet (50 mg) by mouth 2 times daily    Atrial fibrillation, unspecified type (H)       furosemide 20 MG tablet    LASIX    62 tablet    Take 1 tablet (20 mg) by mouth 2 times daily    Atrial fibrillation (H)       lisinopril 20 MG tablet    PRINIVIL/ZESTRIL    90 tablet    TAKE 1 TABLET(20 MG) BY MOUTH DAILY    Atrial fibrillation (H)       rivaroxaban ANTICOAGULANT 20 MG Tabs tablet    XARELTO    90 tablet    Take 1 tablet (20 mg) by mouth daily (with dinner)    Paroxysmal atrial fibrillation (H)       vitamin E 400 UNIT capsule     100 capsule    Take 1 capsule by mouth daily.    Atrial fibrillation (H)

## 2018-03-20 NOTE — PROGRESS NOTES
HISTORY OF PRESENT ILLNESS:  I had the pleasure of seeing Dwight Sanchez for followup in the Liver Clinic at the [facility] on 03/20/2018.  Mr. 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  He is a sustained virologic responder to hepatitis C treatment.      He is doing well at this visit.  In fact he says he actually feels as well at this point in time as he has in several years.  His only complaint is some changes which sound like carpal tunnel syndrome in his right hand.      He denies any abdominal pain, itching or skin rash or fatigue.  He denies any increased abdominal girth or lower extremity edema.  He denies any fevers or chills, cough or shortness of breath.  He denies any nausea, vomiting, diarrhea or constipation.  His appetite has been good, and his weight has remained stable.  He has not had any gastrointestinal bleeding or any overt signs of hepatic encephalopathy.  There have been no other new events since he was last seen.       Current Outpatient Prescriptions   Medication     lisinopril (PRINIVIL/ZESTRIL) 20 MG tablet     atenolol (TENORMIN) 50 MG tablet     rivaroxaban ANTICOAGULANT (XARELTO) 20 MG TABS tablet     furosemide (LASIX) 20 MG tablet     vitamin E 400 UNIT capsule     No current facility-administered medications for this visit.      B/P: 152/94, T: 98.2, P: 79, R: Data Unavailable    HEENT exam shows no scleral icterus and no temporal muscle wasting.  His chest is clear.  His abdominal exam shows no increase in girth.  No masses or tenderness to palpation are present.  His liver is 10-11 cm in span with a quite prominent left lobe.  A spleen tip is palpable 2 fingers below costal margin.  Extremity exam shows no edema.  Skin exam shows no stigmata of chronic liver disease, and neurologic exam shows no asterixis.       Recent Results (from the past 168 hour(s))   Hepatic Panel [LAB20]    Collection Time: 03/20/18  8:41 AM   Result  Value Ref Range    Bilirubin Direct 0.3 (H) 0.0 - 0.2 mg/dL    Bilirubin Total 1.2 0.2 - 1.3 mg/dL    Albumin 4.1 3.4 - 5.0 g/dL    Protein Total 7.9 6.8 - 8.8 g/dL    Alkaline Phosphatase 110 40 - 150 U/L    ALT 52 0 - 70 U/L    AST 40 0 - 45 U/L   Basic metabolic panel [LAB15]    Collection Time: 03/20/18  8:41 AM   Result Value Ref Range    Sodium 138 133 - 144 mmol/L    Potassium 4.0 3.4 - 5.3 mmol/L    Chloride 103 94 - 109 mmol/L    Carbon Dioxide 27 20 - 32 mmol/L    Anion Gap 8 3 - 14 mmol/L    Glucose 132 (H) 70 - 99 mg/dL    Urea Nitrogen 18 7 - 30 mg/dL    Creatinine 0.96 0.66 - 1.25 mg/dL    GFR Estimate 78 >60 mL/min/1.7m2    GFR Estimate If Black >90 >60 mL/min/1.7m2    Calcium 9.1 8.5 - 10.1 mg/dL   CBC with platelets [XTG280]    Collection Time: 03/20/18  8:41 AM   Result Value Ref Range    WBC 5.2 4.0 - 11.0 10e9/L    RBC Count 4.74 4.4 - 5.9 10e12/L    Hemoglobin 15.1 13.3 - 17.7 g/dL    Hematocrit 44.8 40.0 - 53.0 %    MCV 95 78 - 100 fl    MCH 31.9 26.5 - 33.0 pg    MCHC 33.7 31.5 - 36.5 g/dL    RDW 13.6 10.0 - 15.0 %    Platelet Count 107 (L) 150 - 450 10e9/L   INR [[id number]]    Collection Time: 03/20/18  8:41 AM   Result Value Ref Range    INR 1.90 (H) 0.86 - 1.14      I did review his ultrasound which shows a 2 cm lesion.  He has had some lesions that have been seen by ultrasound, but when we get an MRI, there has been nothing seen.  Nonetheless, I will get an MRI in 6 months when he returns for his cirrhosis followup.  He is up-to-date with regard to variceal screening and vaccines and other cancer screening.  I did encourage him to strongly consider getting his carpal tunnel addressed, as I certainly think his liver function is as good now as it ever has been and he certainly would tolerate this.      Thank you very much for allowing me to participate in the care of this patient.  If you have any questions regarding my recommendations, please do not hesitate to contact me.       Dwight Zamudio,  MD      Professor of Medicine  [employer]      Executive Medical Director, Solid Organ Transplant Program  [facility]

## 2018-04-08 ENCOUNTER — OFFICE VISIT (OUTPATIENT)
Dept: URGENT CARE | Facility: URGENT CARE | Age: 63
End: 2018-04-08
Payer: COMMERCIAL

## 2018-04-08 VITALS
HEART RATE: 96 BPM | SYSTOLIC BLOOD PRESSURE: 123 MMHG | TEMPERATURE: 98.1 F | RESPIRATION RATE: 18 BRPM | OXYGEN SATURATION: 96 % | BODY MASS INDEX: 27.84 KG/M2 | WEIGHT: 194 LBS | DIASTOLIC BLOOD PRESSURE: 86 MMHG

## 2018-04-08 DIAGNOSIS — J20.9 ACUTE BRONCHITIS WITH COEXISTING CONDITION REQUIRING PROPHYLACTIC TREATMENT: Primary | ICD-10-CM

## 2018-04-08 PROCEDURE — 99213 OFFICE O/P EST LOW 20 MIN: CPT | Performed by: PHYSICIAN ASSISTANT

## 2018-04-08 RX ORDER — AZITHROMYCIN 250 MG/1
TABLET, FILM COATED ORAL
Qty: 6 TABLET | Refills: 0 | Status: SHIPPED | OUTPATIENT
Start: 2018-04-08

## 2018-04-08 RX ORDER — CODEINE PHOSPHATE AND GUAIFENESIN 10; 100 MG/5ML; MG/5ML
1 SOLUTION ORAL EVERY 4 HOURS PRN
Qty: 120 ML | Refills: 0 | Status: SHIPPED | OUTPATIENT
Start: 2018-04-08

## 2018-04-08 ASSESSMENT — ENCOUNTER SYMPTOMS
WHEEZING: 1
DIAPHORESIS: 0
CARDIOVASCULAR NEGATIVE: 1
GASTROINTESTINAL NEGATIVE: 1
PALPITATIONS: 0
FEVER: 0
SHORTNESS OF BREATH: 1
HEMOPTYSIS: 0
CONSTITUTIONAL NEGATIVE: 1
WEIGHT LOSS: 0
COUGH: 1
EYE PAIN: 0
SPUTUM PRODUCTION: 1

## 2018-04-08 NOTE — MR AVS SNAPSHOT
After Visit Summary   4/8/2018    Dwight Sanchez    MRN: [id number]           Patient Information     Date Of Birth          1955        Visit Information        Provider Department      4/8/2018 2:00 PM Josee Dasilva PA-C [facility]        Today's Diagnoses     Acute bronchitis with coexisting condition requiring prophylactic treatment    -  1       Follow-ups after your visit        Your next 10 appointments already scheduled     Sep 25, 2018  9:30 AM CDT   Lab with  LAB   [facility] ([facility])    [address] [place] [id number] [phone]            Sep 25, 2018 10:00 AM CDT   US ABDOMEN COMPLETE with UCUS1   [facility] ([facility])    [address] [place] [address] [phone]           Please bring a list of your medicines (including vitamins, minerals and over-the-counter drugs). Also, tell your doctor about any allergies you may have. Wear comfortable clothes and leave your valuables at home.  Adults: No eating or drinking for 8 hours before the exam. You may take medicine with a small sip of water.  Children: - Children 6+ years: No food or drink for 6 hours before exam. - Children 1-5 years: No food or drink for 4 hours before exam. - Infants, breast-fed: may have breast milk up to 2 hours before exam. - Infants, formula: may have bottle until 4 hours before exam.  Please call the Imaging Department at your exam site with any questions.            Sep 25, 2018 11:00 AM CDT   (Arrive by 10:45 AM)   Return General Liver with Dwight Zamudio MD   [facility] ([facility])    [address] [place] [address]  [address]  [facility] [id number] [phone]              Who to contact     If you have questions or need follow up information about today's clinic visit or your schedule please contact [facility]  directly at [phone].  Normal or non-critical lab and imaging results will be communicated to you by MyChart, letter or phone within 4 business days after the clinic has received the results. If you do not hear from us within 7 days, please contact the clinic through [employer] or phone. If you have a critical or abnormal lab result, we will notify you by phone as soon as possible.  Submit refill requests through [employer] or call your pharmacy and they will forward the refill request to us. Please allow 3 business days for your refill to be completed.          Additional Information About Your Visit        [employer] Information     [employer] gives you secure access to your electronic health record. If you see a primary care provider, you can also send messages to your care team and make appointments. If you have questions, please call your primary care clinic.  If you do not have a primary care provider, please call [phone] and they will assist you.        Care EveryWhere ID     This is your Care EveryWhere ID. This could be used by other organizations to access your [facility] medical records  [id number]         Blood Pressure from Last 3 Encounters:   03/20/18 (!) 152/94   09/11/17 (!) 138/99   04/05/17 138/88    Weight from Last 3 Encounters:   03/20/18 191 lb 9.6 oz (86.9 kg)   09/11/17 194 lb 6.4 oz (88.2 kg)   04/05/17 194 lb 14.4 oz (88.4 kg)              Today, you had the following     No orders found for display         Today's Medication Changes          These changes are accurate as of 4/8/18  2:33 PM.  If you have any questions, ask your nurse or doctor.               Start taking these medicines.        Dose/Directions    azithromycin 250 MG tablet   Commonly known as:  ZITHROMAX   Used for:  Acute bronchitis with coexisting condition requiring prophylactic treatment        2 tablets the first day, then 1 tablet daily for the next 4 days   Quantity:  6 tablet   Refills:  0       guaiFENesin-codeine  100-10 MG/5ML Soln solution   Commonly known as:  ROBITUSSIN AC   Used for:  Acute bronchitis with coexisting condition requiring prophylactic treatment        Dose:  1 tsp.   Take 5 mLs by mouth every 4 hours as needed for cough   Quantity:  120 mL   Refills:  0            Where to get your medicines      These medications were sent to [id number] - [place], [place] - [facility] AT [address] [place], [place] [address]     Phone:  [phone]     azithromycin 250 MG tablet         Some of these will need a paper prescription and others can be bought over the counter.  Ask your nurse if you have questions.     Bring a paper prescription for each of these medications     guaiFENesin-codeine 100-10 MG/5ML Soln solution                Primary Care Provider Office Phone # Fax #    Hector Acuñao [phone]       [facility] [address]  [place] [address]        Equal Access to Services     [facility]: Hadii aad ku hadasho Soomaali, waaxda luqadaha, qaybta kaalmada adeegyada, waxay idiin hayaan funmi guzman . So [facility] [phone].    ATENCIÓN: Si habla español, tiene a ledesma disposición servicios gratuitos de asistencia lingüística. Ll[facility] [phone].    We comply with applicable federal civil rights laws and [place] laws. We do not discriminate on the basis of race, color, national origin, age, disability, sex, sexual orientation, or gender identity.            Thank you!     Thank you for choosing [facility]  for your care. Our goal is always to provide you with excellent care. Hearing back from our patients is one way we can continue to improve our services. Please take a few minutes to complete the written survey that you may receive in the mail after your visit with us. Thank you!             Your Updated Medication List - Protect others around you: Learn how to safely use, store and throw away your  medicines at www.disposemymeds.org.          This list is accurate as of 4/8/18  2:33 PM.  Always use your most recent med list.                   Brand Name Dispense Instructions for use Diagnosis    atenolol 50 MG tablet    TENORMIN    180 tablet    Take 1 tablet (50 mg) by mouth 2 times daily    Atrial fibrillation, unspecified type (H)       azithromycin 250 MG tablet    ZITHROMAX    6 tablet    2 tablets the first day, then 1 tablet daily for the next 4 days    Acute bronchitis with coexisting condition requiring prophylactic treatment       furosemide 20 MG tablet    LASIX    62 tablet    Take 1 tablet (20 mg) by mouth 2 times daily    Atrial fibrillation (H)       guaiFENesin-codeine 100-10 MG/5ML Soln solution    ROBITUSSIN AC    120 mL    Take 5 mLs by mouth every 4 hours as needed for cough    Acute bronchitis with coexisting condition requiring prophylactic treatment       lisinopril 20 MG tablet    PRINIVIL/ZESTRIL    90 tablet    TAKE 1 TABLET(20 MG) BY MOUTH DAILY    Atrial fibrillation (H)       rivaroxaban ANTICOAGULANT 20 MG Tabs tablet    XARELTO    90 tablet    Take 1 tablet (20 mg) by mouth daily (with dinner)    Paroxysmal atrial fibrillation (H)       vitamin E 400 UNIT capsule     100 capsule    Take 1 capsule by mouth daily.    Atrial fibrillation (H)

## 2018-04-08 NOTE — PROGRESS NOTES
SUBJECTIVE:                                                      HPI   Dwight Sanchez is a 62 year old male who presents to clinic today for the following health issues:  RESPIRATORY SYMPTOMS    Duration: 5 days     Description  Productive cough along with chest congestion and mild shortness of breath and wheezing     Severity: moderate    Accompanying signs and symptoms: No sore throat or sinus congestion/pain/pressure.  No abdominal pain, n/v, constipation, diarrhea, bloody or black tarry stools.  No fever, chills or sweats.    History (predisposing factors):  No ill contacts.  No pmh of asthma.  Non-smoker.  Hx of chronic hep c and afib on xarelto.    Precipitating or alleviating factors: None    Therapies tried and outcome:  OTC medications with no relief     Reviewed PMH.  Patient Active Problem List   Diagnosis     Atrial fibrillation (H)     Acute hepatitis C without hepatic coma     Biliary cirrhosis (H)     Unspecified hyperplasia of prostate with urinary obstruction and other lower urinary tract symptoms (LUTS)     Urinary frequency     ED (erectile dysfunction)     Atrial flutter (H)     Family history of sudden cardiac death     Hepatic cirrhosis due to chronic hepatitis C infection (H)     S/P ablation of atrial fibrillation     Back pain     Advanced directives, counseling/discussion     Current Outpatient Prescriptions   Medication Sig Dispense Refill     lisinopril (PRINIVIL/ZESTRIL) 20 MG tablet TAKE 1 TABLET(20 MG) BY MOUTH DAILY 90 tablet 1     atenolol (TENORMIN) 50 MG tablet Take 1 tablet (50 mg) by mouth 2 times daily 180 tablet 3     rivaroxaban ANTICOAGULANT (XARELTO) 20 MG TABS tablet Take 1 tablet (20 mg) by mouth daily (with dinner) 90 tablet 0     furosemide (LASIX) 20 MG tablet Take 1 tablet (20 mg) by mouth 2 times daily 62 tablet 6     vitamin E 400 UNIT capsule Take 1 capsule by mouth daily. 100 capsule 12     No Known Allergies    Review of Systems   Constitutional: Negative.   Negative for diaphoresis, fever and weight loss.   HENT: Negative.    Eyes: Negative for pain.   Respiratory: Positive for cough, sputum production, shortness of breath and wheezing. Negative for hemoptysis.    Cardiovascular: Negative.  Negative for chest pain and palpitations.   Gastrointestinal: Negative.    Skin: Negative.    All other systems reviewed and are negative.      /86  Pulse 96  Temp 98.1  F (36.7  C)  Resp 18  Wt 194 lb (88 kg)  SpO2 96%  BMI 27.84 kg/m2  Physical Exam   Constitutional: He is oriented to person, place, and time and well-developed, well-nourished, and in no distress. No distress.   HENT:   Head: Normocephalic and atraumatic.   Nose: Nose normal.   Mouth/Throat: Uvula is midline, oropharynx is clear and moist and mucous membranes are normal. No oropharyngeal exudate or posterior oropharyngeal erythema.   TMs are intact without any erythema or bulging bilaterally.  Airway is patent.   Eyes: Conjunctivae and EOM are normal. Pupils are equal, round, and reactive to light. No scleral icterus.   Neck: Normal range of motion. Neck supple. No thyromegaly present.   Cardiovascular: Normal rate, regular rhythm, normal heart sounds and intact distal pulses.  Exam reveals no gallop and no friction rub.    No murmur heard.  Pulmonary/Chest: Effort normal and breath sounds normal. No accessory muscle usage. No tachypnea. No respiratory distress. He has no decreased breath sounds. He has no wheezes. He has no rhonchi. He has no rales.   Lymphadenopathy:     He has no cervical adenopathy.   Neurological: He is alert and oriented to person, place, and time.   Skin: Skin is warm, dry and intact. No cyanosis. Nails show no clubbing.   Psychiatric: Mood and affect normal.   Nursing note and vitals reviewed.        Assessment/Plan:  Acute bronchitis with coexisting condition requiring prophylactic treatment:  Will treat with zithromax X5days and robitussin AC as needed for symptoms.    Discussed risks and benefits of medication along with side effects, direction for use.  No driving or operating machinery due to sedation. Recommend treatment with rest, fluids and chicken soup. Tylenol/ibuprofen prn fever/pain.  Recheck in clinic if symptoms worsen or if symptoms do not improve.  To the ER if he develops hemoptysis, chest pain, fevers>102, worsening shortness of breath/wheezing.    -     azithromycin (ZITHROMAX) 250 MG tablet; 2 tablets the first day, then 1 tablet daily for the next 4 days  -     guaiFENesin-codeine (ROBITUSSIN AC) 100-10 MG/5ML SOLN solution; Take 5 mLs by mouth every 4 hours as needed for cough          Josee See GISEL Dasilva

## 2018-04-22 DIAGNOSIS — I48.91 ATRIAL FIBRILLATION (H): ICD-10-CM

## 2018-04-23 RX ORDER — LISINOPRIL 20 MG/1
TABLET ORAL
Qty: 90 TABLET | Refills: 3 | Status: SHIPPED | OUTPATIENT
Start: 2018-04-23

## 2018-09-25 ENCOUNTER — RADIANT APPOINTMENT (OUTPATIENT)
Dept: ULTRASOUND IMAGING | Facility: CLINIC | Age: 63
End: 2018-09-25
Attending: INTERNAL MEDICINE
Payer: COMMERCIAL

## 2018-09-25 ENCOUNTER — OFFICE VISIT (OUTPATIENT)
Dept: GASTROENTEROLOGY | Facility: CLINIC | Age: 63
End: 2018-09-25
Attending: INTERNAL MEDICINE
Payer: COMMERCIAL

## 2018-09-25 VITALS
DIASTOLIC BLOOD PRESSURE: 109 MMHG | HEART RATE: 95 BPM | BODY MASS INDEX: 27.33 KG/M2 | OXYGEN SATURATION: 96 % | WEIGHT: 190.5 LBS | SYSTOLIC BLOOD PRESSURE: 165 MMHG | TEMPERATURE: 98.1 F

## 2018-09-25 DIAGNOSIS — K74.60 CIRRHOSIS OF LIVER WITHOUT ASCITES, UNSPECIFIED HEPATIC CIRRHOSIS TYPE (H): ICD-10-CM

## 2018-09-25 DIAGNOSIS — K74.60 CIRRHOSIS OF LIVER WITHOUT ASCITES, UNSPECIFIED HEPATIC CIRRHOSIS TYPE (H): Primary | ICD-10-CM

## 2018-09-25 LAB
AFP SERPL-MCNC: 4.8 UG/L (ref 0–8)
ALBUMIN SERPL-MCNC: 4.3 G/DL (ref 3.4–5)
ALP SERPL-CCNC: 129 U/L (ref 40–150)
ALT SERPL W P-5'-P-CCNC: 43 U/L (ref 0–70)
ANION GAP SERPL CALCULATED.3IONS-SCNC: 7 MMOL/L (ref 3–14)
AST SERPL W P-5'-P-CCNC: 42 U/L (ref 0–45)
BILIRUB DIRECT SERPL-MCNC: 0.4 MG/DL (ref 0–0.2)
BILIRUB SERPL-MCNC: 1.2 MG/DL (ref 0.2–1.3)
BUN SERPL-MCNC: 21 MG/DL (ref 7–30)
CALCIUM SERPL-MCNC: 9.6 MG/DL (ref 8.5–10.1)
CHLORIDE SERPL-SCNC: 102 MMOL/L (ref 94–109)
CO2 SERPL-SCNC: 27 MMOL/L (ref 20–32)
CREAT SERPL-MCNC: 1.01 MG/DL (ref 0.66–1.25)
ERYTHROCYTE [DISTWIDTH] IN BLOOD BY AUTOMATED COUNT: 13.3 % (ref 10–15)
GFR SERPL CREATININE-BSD FRML MDRD: 74 ML/MIN/1.7M2
GLUCOSE SERPL-MCNC: 106 MG/DL (ref 70–99)
HCT VFR BLD AUTO: 43.3 % (ref 40–53)
HGB BLD-MCNC: 14.5 G/DL (ref 13.3–17.7)
INR PPP: 1.96 (ref 0.86–1.14)
MCH RBC QN AUTO: 30.9 PG (ref 26.5–33)
MCHC RBC AUTO-ENTMCNC: 33.5 G/DL (ref 31.5–36.5)
MCV RBC AUTO: 92 FL (ref 78–100)
PLATELET # BLD AUTO: 117 10E9/L (ref 150–450)
POTASSIUM SERPL-SCNC: 4.2 MMOL/L (ref 3.4–5.3)
PROT SERPL-MCNC: 8.7 G/DL (ref 6.8–8.8)
RBC # BLD AUTO: 4.7 10E12/L (ref 4.4–5.9)
SODIUM SERPL-SCNC: 136 MMOL/L (ref 133–144)
WBC # BLD AUTO: 4.7 10E9/L (ref 4–11)

## 2018-09-25 PROCEDURE — 85027 COMPLETE CBC AUTOMATED: CPT | Performed by: INTERNAL MEDICINE

## 2018-09-25 PROCEDURE — 36415 COLL VENOUS BLD VENIPUNCTURE: CPT | Performed by: INTERNAL MEDICINE

## 2018-09-25 PROCEDURE — 80048 BASIC METABOLIC PNL TOTAL CA: CPT | Performed by: INTERNAL MEDICINE

## 2018-09-25 PROCEDURE — G0463 HOSPITAL OUTPT CLINIC VISIT: HCPCS | Mod: ZF

## 2018-09-25 PROCEDURE — 80076 HEPATIC FUNCTION PANEL: CPT | Performed by: INTERNAL MEDICINE

## 2018-09-25 PROCEDURE — 85610 PROTHROMBIN TIME: CPT | Performed by: INTERNAL MEDICINE

## 2018-09-25 PROCEDURE — 82105 ALPHA-FETOPROTEIN SERUM: CPT | Performed by: INTERNAL MEDICINE

## 2018-09-25 RX ORDER — METOPROLOL SUCCINATE 100 MG/1
TABLET, EXTENDED RELEASE ORAL
Refills: 4 | COMMUNITY
Start: 2018-09-21

## 2018-09-25 ASSESSMENT — PAIN SCALES - GENERAL: PAINLEVEL: NO PAIN (0)

## 2018-09-25 NOTE — LETTER
9/25/2018      RE: Dwight Sanchez  [address] [facility] [address]       HISTORY OF PRESENT ILLNESS:  I had the pleasure of seeing Dwight Sanchez for followup in the Liver Transplant Clinic at the [facility] on 09/25/2018.  Mr. 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  He is a sustained virologic responder to hepatitis C treatment.      The major new event that happened is that he did have an AV sheree ablation and placement of an arterial ventricular sequential pacemaker.  This was only done about a week and a half ago and he has had 1 episode of atrial fibrillation since then.  Interestingly, he also by echo had evidence of a cardiomyopathy which they thought was related to a chronic tachycardia.      From a liver standpoint, he is doing extremely well.  He denies any abdominal pain, itching or skin rash and is working full time.  He denies any increased abdominal girth or lower extremity edema.  He has not had any gastrointestinal bleeding or any overt signs of hepatic encephalopathy.  He denies any fevers or chills, cough or shortness of breath.  He denies any nausea or vomiting, diarrhea or constipation.  His appetite has been good, and his weight has been stable.     Current Outpatient Prescriptions   Medication     furosemide (LASIX) 20 MG tablet     metoprolol succinate (TOPROL-XL) 100 MG 24 hr tablet     rivaroxaban ANTICOAGULANT (XARELTO) 20 MG TABS tablet     vitamin E 400 UNIT capsule     atenolol (TENORMIN) 50 MG tablet     azithromycin (ZITHROMAX) 250 MG tablet     guaiFENesin-codeine (ROBITUSSIN AC) 100-10 MG/5ML SOLN solution     lisinopril (PRINIVIL/ZESTRIL) 20 MG tablet     No current facility-administered medications for this visit.      B/P: 165/109, T: 98.1, P: 95, R: Data Unavailable    In general he looks quite well.  HEENT exam shows no scleral icterus or temporal muscle wasting.  His chest is clear.  His abdominal  exam shows no increase in girth.  No masses or tenderness to palpation are present.  His liver is 10 cm in span without left lobe enlargement.  No spleen tip is palpable.  Extremity exam shows no edema.  Skin exam shows no stigmata of chronic liver disease.  Neurologic exam shows no asterixis.     Recent Results (from the past 168 hour(s))   Hepatic Panel [LAB20]    Collection Time: 09/25/18  9:42 AM   Result Value Ref Range    Bilirubin Direct 0.4 (H) 0.0 - 0.2 mg/dL    Bilirubin Total 1.2 0.2 - 1.3 mg/dL    Albumin 4.3 3.4 - 5.0 g/dL    Protein Total 8.7 6.8 - 8.8 g/dL    Alkaline Phosphatase 129 40 - 150 U/L    ALT 43 0 - 70 U/L    AST 42 0 - 45 U/L   Basic metabolic panel [LAB15]    Collection Time: 09/25/18  9:42 AM   Result Value Ref Range    Sodium 136 133 - 144 mmol/L    Potassium 4.2 3.4 - 5.3 mmol/L    Chloride 102 94 - 109 mmol/L    Carbon Dioxide 27 20 - 32 mmol/L    Anion Gap 7 3 - 14 mmol/L    Glucose 106 (H) 70 - 99 mg/dL    Urea Nitrogen 21 7 - 30 mg/dL    Creatinine 1.01 0.66 - 1.25 mg/dL    GFR Estimate 74 >60 mL/min/1.7m2    GFR Estimate If Black 90 >60 mL/min/1.7m2    Calcium 9.6 8.5 - 10.1 mg/dL   CBC with platelets [[id number]]    Collection Time: 09/25/18  9:42 AM   Result Value Ref Range    WBC 4.7 4.0 - 11.0 10e9/L    RBC Count 4.70 4.4 - 5.9 10e12/L    Hemoglobin 14.5 13.3 - 17.7 g/dL    Hematocrit 43.3 40.0 - 53.0 %    MCV 92 78 - 100 fl    MCH 30.9 26.5 - 33.0 pg    MCHC 33.5 31.5 - 36.5 g/dL    RDW 13.3 10.0 - 15.0 %    Platelet Count 117 (L) 150 - 450 10e9/L   INR [[id number]]    Collection Time: 09/25/18  9:42 AM   Result Value Ref Range    INR 1.96 (H) 0.86 - 1.14      I did review his ultrasound which shows no mass lesions and no ascites.  He still has a cirrhotic configuration of his liver.      IMPRESSION:  Mr. 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  He really is doing extremely well at this time.  He is up-to-date with regard to vaccines and cancer screening.  His last  upper GI endoscopy was about 5 years ago.  It showed no varices and if anything he has had signs of improvement in his portal hypertension.  The only lab that is abnormal is his INR, which is a reflection of the Xeralto he is on.     My plan will be to see the patient back in the clinic in 6 months.      Thank you very much for allowing me to participate in the care of this patient.  If you have any questions regarding my recommendations, please do not hesitate to contact me.       Dwight Zamudio MD      Professor of Medicine  [employer]      Executive Medical Director, Solid Organ Transplant Program  [facility]

## 2018-09-25 NOTE — NURSING NOTE
Chief Complaint   Patient presents with     RECHECK     Hepatic cirrhosis due to chronic hepatitis C infection (H)     BP (!) 165/109  Pulse 95  Temp 98.1  F (36.7  C) (Oral)  Wt 86.4 kg (190 lb 8 oz)  SpO2 96%  BMI 27.33 kg/m2  Tiesha Nieves MA

## 2018-09-25 NOTE — PROGRESS NOTES
HISTORY OF PRESENT ILLNESS:  I had the pleasure of seeing Dwight Sanchez for followup in the Liver Transplant Clinic at the [facility] [place] on 09/25/2018.  Mr. 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  He is a sustained virologic responder to hepatitis C treatment.      The major new event that happened is that he did have an AV sheree ablation and placement of an arterial ventricular sequential pacemaker.  This was only done about a week and a half ago and he has had 1 episode of atrial fibrillation since then.  Interestingly, he also by echo had evidence of a cardiomyopathy which they thought was related to a chronic tachycardia.      From a liver standpoint, he is doing extremely well.  He denies any abdominal pain, itching or skin rash and is working full time.  He denies any increased abdominal girth or lower extremity edema.  He has not had any gastrointestinal bleeding or any overt signs of hepatic encephalopathy.  He denies any fevers or chills, cough or shortness of breath.  He denies any nausea or vomiting, diarrhea or constipation.  His appetite has been good, and his weight has been stable.     Current Outpatient Prescriptions   Medication     furosemide (LASIX) 20 MG tablet     metoprolol succinate (TOPROL-XL) 100 MG 24 hr tablet     rivaroxaban ANTICOAGULANT (XARELTO) 20 MG TABS tablet     vitamin E 400 UNIT capsule     atenolol (TENORMIN) 50 MG tablet     azithromycin (ZITHROMAX) 250 MG tablet     guaiFENesin-codeine (ROBITUSSIN AC) 100-10 MG/5ML SOLN solution     lisinopril (PRINIVIL/ZESTRIL) 20 MG tablet     No current facility-administered medications for this visit.      B/P: 165/109, T: 98.1, P: 95, R: Data Unavailable    In general he looks quite well.  HEENT exam shows no scleral icterus or temporal muscle wasting.  His chest is clear.  His abdominal exam shows no increase in girth.  No masses or tenderness to palpation are present.  His liver  is 10 cm in span without left lobe enlargement.  No spleen tip is palpable.  Extremity exam shows no edema.  Skin exam shows no stigmata of chronic liver disease.  Neurologic exam shows no asterixis.     Recent Results (from the past 168 hour(s))   Hepatic Panel [LAB20]    Collection Time: 09/25/18  9:42 AM   Result Value Ref Range    Bilirubin Direct 0.4 (H) 0.0 - 0.2 mg/dL    Bilirubin Total 1.2 0.2 - 1.3 mg/dL    Albumin 4.3 3.4 - 5.0 g/dL    Protein Total 8.7 6.8 - 8.8 g/dL    Alkaline Phosphatase 129 40 - 150 U/L    ALT 43 0 - 70 U/L    AST 42 0 - 45 U/L   Basic metabolic panel [LAB15]    Collection Time: 09/25/18  9:42 AM   Result Value Ref Range    Sodium 136 133 - 144 mmol/L    Potassium 4.2 3.4 - 5.3 mmol/L    Chloride 102 94 - 109 mmol/L    Carbon Dioxide 27 20 - 32 mmol/L    Anion Gap 7 3 - 14 mmol/L    Glucose 106 (H) 70 - 99 mg/dL    Urea Nitrogen 21 7 - 30 mg/dL    Creatinine 1.01 0.66 - 1.25 mg/dL    GFR Estimate 74 >60 mL/min/1.7m2    GFR Estimate If Black 90 >60 mL/min/1.7m2    Calcium 9.6 8.5 - 10.1 mg/dL   CBC with platelets [APB049]    Collection Time: 09/25/18  9:42 AM   Result Value Ref Range    WBC 4.7 4.0 - 11.0 10e9/L    RBC Count 4.70 4.4 - 5.9 10e12/L    Hemoglobin 14.5 13.3 - 17.7 g/dL    Hematocrit 43.3 40.0 - 53.0 %    MCV 92 78 - 100 fl    MCH 30.9 26.5 - 33.0 pg    MCHC 33.5 31.5 - 36.5 g/dL    RDW 13.3 10.0 - 15.0 %    Platelet Count 117 (L) 150 - 450 10e9/L   INR [TIL6798]    Collection Time: 09/25/18  9:42 AM   Result Value Ref Range    INR 1.96 (H) 0.86 - 1.14      I did review his ultrasound which shows no mass lesions and no ascites.  He still has a cirrhotic configuration of his liver.      IMPRESSION:  Mr. 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  He really is doing extremely well at this time.  He is up-to-date with regard to vaccines and cancer screening.  His last upper GI endoscopy was about 5 years ago.  It showed no varices and if anything he has had signs of  improvement in his portal hypertension.  The only lab that is abnormal is his INR, which is a reflection of the Xeralto he is on.     My plan will be to see the patient back in the clinic in 6 months.      Thank you very much for allowing me to participate in the care of this patient.  If you have any questions regarding my recommendations, please do not hesitate to contact me.       Dwight Zamudio MD      Professor of Medicine  [employer]      Executive Medical Director, Solid Organ Transplant Program  [facility]

## 2018-09-25 NOTE — MR AVS SNAPSHOT
After Visit Summary   9/25/2018    Dwight Sanchez    MRN: [id number]           Patient Information     Date Of Birth          1955        Visit Information        Provider Department      9/25/2018 11:00 AM Dwight Zamudio MD [facility]        Today's Diagnoses     Cirrhosis of liver without ascites, unspecified hepatic cirrhosis type (H)    -  1       Follow-ups after your visit        Follow-up notes from your care team     Return in about 6 months (around 3/25/2019).      Future tests that were ordered for you today     Open Standing Orders        Priority Remaining Interval Expires Ordered    US abdomen complete [OFH166] Routine 2/2 Every 6 Months 9/25/2019 9/25/2018          Open Future Orders        Priority Expected Expires Ordered    Hepatic Panel [LAB20] Routine 3/24/2019 9/25/2019 9/25/2018    Basic metabolic panel [LAB15] Routine 3/24/2019 9/25/2019 9/25/2018    CBC with platelets [WSU178] Routine 3/24/2019 9/25/2019 9/25/2018    INR [BZW1933] Routine 3/24/2019 9/25/2019 9/25/2018    AFP tumor marker [ZDK350] Routine 3/24/2019 9/25/2019 9/25/2018            Who to contact     If you have questions or need follow up information about today's clinic visit or your schedule please contact [facility] directly at [phone].  Normal or non-critical lab and imaging results will be communicated to you by Quirkyhart, letter or phone within 4 business days after the clinic has received the results. If you do not hear from us within 7 days, please contact the clinic through Quirkyhart or phone. If you have a critical or abnormal lab result, we will notify you by phone as soon as possible.  Submit refill requests through Akatsuki or call your pharmacy and they will forward the refill request to us. Please allow 3 business days for your refill to be completed.          Additional Information About Your Visit        Quirkyhart Information     Akatsuki gives you secure access to your electronic  health record. If you see a primary care provider, you can also send messages to your care team and make appointments. If you have questions, please call your primary care clinic.  If you do not have a primary care provider, please call [phone] and they will assist you.        Care EveryWhere ID     This is your Care EveryWhere ID. This could be used by other organizations to access your [facility] medical records  [id number]        Your Vitals Were     Pulse Temperature Pulse Oximetry BMI (Body Mass Index)          95 98.1  F (36.7  C) (Oral) 96% 27.33 kg/m2         Blood Pressure from Last 3 Encounters:   09/25/18 (!) 165/109   04/08/18 123/86   03/20/18 (!) 152/94    Weight from Last 3 Encounters:   09/25/18 86.4 kg (190 lb 8 oz)   04/08/18 88 kg (194 lb)   03/20/18 86.9 kg (191 lb 9.6 oz)              We Performed the Following     Schedule follow up appointments        Primary Care Provider Office Phone # Fax #    Hector JUAREZ Shannock [phone]       [facility] [address]  [facility] [address]        Equal Access to Services     [facility] BART : Hadii aad ku hadasho Soomaali, waaxda luqadaha, qaybta kaalmada adeegyada, waxay barryin hayaan adeeg khmary la'aan ah. So [facility] [phone].    ATENCIÓN: Si habla español, tiene a ledesma disposición servicios gratuitos de asistencia lingüística. Makayla al [phone].    We comply with applicable federal civil rights laws and [place] laws. We do not discriminate on the basis of race, color, national origin, age, disability, sex, sexual orientation, or gender identity.            Thank you!     Thank you for choosing [facility]  for your care. Our goal is always to provide you with excellent care. Hearing back from our patients is one way we can continue to improve our services. Please take a few minutes to complete the written survey that you may receive in the mail after your visit with us. Thank you!             Your Updated Medication List -  Protect others around you: Learn how to safely use, store and throw away your medicines at www.disposemymeds.org.          This list is accurate as of 9/25/18 11:59 PM.  Always use your most recent med list.                   Brand Name Dispense Instructions for use Diagnosis    atenolol 50 MG tablet    TENORMIN    180 tablet    Take 1 tablet (50 mg) by mouth 2 times daily    Atrial fibrillation, unspecified type (H)       azithromycin 250 MG tablet    ZITHROMAX    6 tablet    2 tablets the first day, then 1 tablet daily for the next 4 days    Acute bronchitis with coexisting condition requiring prophylactic treatment       furosemide 20 MG tablet    LASIX    62 tablet    Take 1 tablet (20 mg) by mouth 2 times daily    Atrial fibrillation (H)       guaiFENesin-codeine 100-10 MG/5ML Soln solution    ROBITUSSIN AC    120 mL    Take 5 mLs by mouth every 4 hours as needed for cough    Acute bronchitis with coexisting condition requiring prophylactic treatment       lisinopril 20 MG tablet    PRINIVIL/ZESTRIL    90 tablet    TAKE 1 TABLET(20 MG) BY MOUTH DAILY    Atrial fibrillation (H)       metoprolol succinate 100 MG 24 hr tablet    TOPROL-XL     TK 1 T PO BID        rivaroxaban ANTICOAGULANT 20 MG Tabs tablet    XARELTO    90 tablet    Take 1 tablet (20 mg) by mouth daily (with dinner)    Paroxysmal atrial fibrillation (H)       vitamin E 400 UNIT capsule     100 capsule    Take 1 capsule by mouth daily.    Atrial fibrillation (H)

## 2019-11-05 ENCOUNTER — HEALTH MAINTENANCE LETTER (OUTPATIENT)
Age: 64
End: 2019-11-05

## 2020-02-16 ENCOUNTER — HEALTH MAINTENANCE LETTER (OUTPATIENT)
Age: 65
End: 2020-02-16

## 2020-10-14 DIAGNOSIS — Z86.0101 HISTORY OF ADENOMATOUS POLYP OF COLON: Primary | ICD-10-CM

## 2020-10-16 ENCOUNTER — TELEPHONE (OUTPATIENT)
Dept: GASTROENTEROLOGY | Facility: CLINIC | Age: 65
End: 2020-10-16

## 2020-10-19 ENCOUNTER — TELEPHONE (OUTPATIENT)
Dept: GASTROENTEROLOGY | Facility: CLINIC | Age: 65
End: 2020-10-19

## 2020-10-20 ENCOUNTER — TELEPHONE (OUTPATIENT)
Dept: GASTROENTEROLOGY | Facility: CLINIC | Age: 65
End: 2020-10-20

## 2020-10-20 NOTE — TELEPHONE ENCOUNTER
Patient called in regards to getting phone calls to schedule his colonoscopy. Patient stated that he no longer lives in [place] and will not be scheduling this procedure.

## 2020-11-22 ENCOUNTER — HEALTH MAINTENANCE LETTER (OUTPATIENT)
Age: 65
End: 2020-11-22

## 2021-04-04 ENCOUNTER — HEALTH MAINTENANCE LETTER (OUTPATIENT)
Age: 66
End: 2021-04-04

## 2021-09-19 ENCOUNTER — HEALTH MAINTENANCE LETTER (OUTPATIENT)
Age: 66
End: 2021-09-19

## 2022-05-01 ENCOUNTER — HEALTH MAINTENANCE LETTER (OUTPATIENT)
Age: 67
End: 2022-05-01

## 2022-11-20 ENCOUNTER — HEALTH MAINTENANCE LETTER (OUTPATIENT)
Age: 67
End: 2022-11-20

## 2023-06-02 ENCOUNTER — HEALTH MAINTENANCE LETTER (OUTPATIENT)
Age: 68
End: 2023-06-02